# Patient Record
Sex: MALE | Employment: OTHER | URBAN - METROPOLITAN AREA
[De-identification: names, ages, dates, MRNs, and addresses within clinical notes are randomized per-mention and may not be internally consistent; named-entity substitution may affect disease eponyms.]

---

## 2022-04-12 ENCOUNTER — TRANSFERRED RECORDS (OUTPATIENT)
Dept: HEALTH INFORMATION MANAGEMENT | Facility: CLINIC | Age: 87
End: 2022-04-12

## 2022-04-20 ENCOUNTER — TRANSFERRED RECORDS (OUTPATIENT)
Dept: HEALTH INFORMATION MANAGEMENT | Facility: CLINIC | Age: 87
End: 2022-04-20

## 2022-07-26 ENCOUNTER — TRANSFERRED RECORDS (OUTPATIENT)
Dept: HEALTH INFORMATION MANAGEMENT | Facility: CLINIC | Age: 87
End: 2022-07-26

## 2023-05-08 ENCOUNTER — TELEPHONE (OUTPATIENT)
Dept: PRIMARY CARE | Facility: CLINIC | Age: 88
End: 2023-05-08

## 2023-05-08 DIAGNOSIS — I10 BENIGN ESSENTIAL HYPERTENSION: ICD-10-CM

## 2023-05-08 DIAGNOSIS — K58.9 IRRITABLE BOWEL SYNDROME, UNSPECIFIED TYPE: ICD-10-CM

## 2023-05-08 DIAGNOSIS — I48.20 CHRONIC ATRIAL FIBRILLATION (MULTI): ICD-10-CM

## 2023-05-08 DIAGNOSIS — F51.01 PRIMARY INSOMNIA: Primary | ICD-10-CM

## 2023-05-08 DIAGNOSIS — E78.2 HYPERLIPEMIA, MIXED: ICD-10-CM

## 2023-05-08 PROBLEM — K21.9 GASTROESOPHAGEAL REFLUX DISEASE WITHOUT ESOPHAGITIS: Status: ACTIVE | Noted: 2023-05-08

## 2023-05-08 PROBLEM — J30.9 ALLERGIC RHINITIS: Status: ACTIVE | Noted: 2023-05-08

## 2023-05-08 PROBLEM — G47.00 INSOMNIA: Status: ACTIVE | Noted: 2023-05-08

## 2023-05-08 PROBLEM — I48.91 ATRIAL FIBRILLATION (MULTI): Status: ACTIVE | Noted: 2023-05-08

## 2023-05-08 PROBLEM — N40.0 BPH WITHOUT URINARY OBSTRUCTION: Status: ACTIVE | Noted: 2023-05-08

## 2023-05-08 RX ORDER — ATORVASTATIN CALCIUM 20 MG/1
20 TABLET, FILM COATED ORAL DAILY
COMMUNITY
Start: 2021-01-29 | End: 2023-05-08 | Stop reason: SDUPTHER

## 2023-05-08 RX ORDER — APIXABAN 5 MG/1
5 TABLET, FILM COATED ORAL 2 TIMES DAILY
COMMUNITY
End: 2023-05-08 | Stop reason: SDUPTHER

## 2023-05-08 RX ORDER — DICYCLOMINE HYDROCHLORIDE 10 MG/1
10 CAPSULE ORAL 3 TIMES DAILY
COMMUNITY
Start: 2021-01-06 | End: 2023-05-08 | Stop reason: SDUPTHER

## 2023-05-08 RX ORDER — TRAZODONE HYDROCHLORIDE 50 MG/1
50 TABLET ORAL NIGHTLY PRN
COMMUNITY
End: 2023-05-08 | Stop reason: SDUPTHER

## 2023-05-08 RX ORDER — RAMIPRIL 5 MG/1
5 CAPSULE ORAL DAILY
COMMUNITY
Start: 2020-12-21 | End: 2023-05-08 | Stop reason: SDUPTHER

## 2023-05-08 RX ORDER — TRAZODONE HYDROCHLORIDE 50 MG/1
50 TABLET ORAL NIGHTLY PRN
Qty: 90 TABLET | Refills: 0 | Status: SHIPPED | OUTPATIENT
Start: 2023-05-08

## 2023-05-08 RX ORDER — RAMIPRIL 5 MG/1
5 CAPSULE ORAL DAILY
Qty: 90 CAPSULE | Refills: 0 | Status: SHIPPED | OUTPATIENT
Start: 2023-05-08 | End: 2023-07-19 | Stop reason: SDUPTHER

## 2023-05-08 RX ORDER — DICYCLOMINE HYDROCHLORIDE 10 MG/1
10 CAPSULE ORAL 3 TIMES DAILY
Qty: 180 CAPSULE | Refills: 0 | Status: SHIPPED | OUTPATIENT
Start: 2023-05-08

## 2023-05-08 RX ORDER — ATORVASTATIN CALCIUM 20 MG/1
20 TABLET, FILM COATED ORAL DAILY
Qty: 90 TABLET | Refills: 0 | Status: SHIPPED | OUTPATIENT
Start: 2023-05-08

## 2023-05-18 DIAGNOSIS — K21.9 GASTROESOPHAGEAL REFLUX DISEASE WITHOUT ESOPHAGITIS: Primary | ICD-10-CM

## 2023-05-18 RX ORDER — OMEPRAZOLE 40 MG/1
40 CAPSULE, DELAYED RELEASE ORAL DAILY
Qty: 90 CAPSULE | Refills: 0 | Status: SHIPPED | OUTPATIENT
Start: 2023-05-18

## 2023-05-18 RX ORDER — OMEPRAZOLE 40 MG/1
40 CAPSULE, DELAYED RELEASE ORAL DAILY
COMMUNITY
Start: 2023-02-22 | End: 2023-05-18 | Stop reason: SDUPTHER

## 2023-07-17 DIAGNOSIS — I10 BENIGN ESSENTIAL HYPERTENSION: ICD-10-CM

## 2023-07-17 RX ORDER — RAMIPRIL 5 MG/1
CAPSULE ORAL
Qty: 90 CAPSULE | Refills: 3 | OUTPATIENT
Start: 2023-07-17

## 2023-07-19 RX ORDER — RAMIPRIL 5 MG/1
5 CAPSULE ORAL DAILY
Qty: 90 CAPSULE | Refills: 0 | Status: SHIPPED | OUTPATIENT
Start: 2023-07-19

## 2023-08-22 ENCOUNTER — OFFICE VISIT (OUTPATIENT)
Dept: FAMILY MEDICINE | Facility: CLINIC | Age: 88
End: 2023-08-22
Payer: COMMERCIAL

## 2023-08-22 VITALS
TEMPERATURE: 98.4 F | SYSTOLIC BLOOD PRESSURE: 126 MMHG | WEIGHT: 169.06 LBS | DIASTOLIC BLOOD PRESSURE: 80 MMHG | OXYGEN SATURATION: 95 % | HEART RATE: 69 BPM | RESPIRATION RATE: 16 BRPM | HEIGHT: 67 IN | BODY MASS INDEX: 26.53 KG/M2

## 2023-08-22 DIAGNOSIS — I25.10 CORONARY ARTERY DISEASE INVOLVING NATIVE HEART WITHOUT ANGINA PECTORIS, UNSPECIFIED VESSEL OR LESION TYPE: ICD-10-CM

## 2023-08-22 DIAGNOSIS — G47.00 INSOMNIA, UNSPECIFIED TYPE: ICD-10-CM

## 2023-08-22 DIAGNOSIS — Z76.89 ENCOUNTER TO ESTABLISH CARE: Primary | ICD-10-CM

## 2023-08-22 DIAGNOSIS — I48.91 ATRIAL FIBRILLATION, UNSPECIFIED TYPE (H): ICD-10-CM

## 2023-08-22 DIAGNOSIS — K21.9 GASTROESOPHAGEAL REFLUX DISEASE WITHOUT ESOPHAGITIS: ICD-10-CM

## 2023-08-22 DIAGNOSIS — N40.0 BPH WITHOUT URINARY OBSTRUCTION: ICD-10-CM

## 2023-08-22 DIAGNOSIS — I10 BENIGN ESSENTIAL HYPERTENSION: ICD-10-CM

## 2023-08-22 DIAGNOSIS — K58.9 IRRITABLE BOWEL SYNDROME, UNSPECIFIED TYPE: ICD-10-CM

## 2023-08-22 DIAGNOSIS — I48.92 ATRIAL FLUTTER, UNSPECIFIED TYPE (H): ICD-10-CM

## 2023-08-22 DIAGNOSIS — E78.2 HYPERLIPEMIA, MIXED: ICD-10-CM

## 2023-08-22 LAB
ANION GAP SERPL CALCULATED.3IONS-SCNC: 8 MMOL/L (ref 7–15)
BUN SERPL-MCNC: 12.4 MG/DL (ref 8–23)
CALCIUM SERPL-MCNC: 8.8 MG/DL (ref 8.8–10.2)
CHLORIDE SERPL-SCNC: 106 MMOL/L (ref 98–107)
CHOLEST SERPL-MCNC: 108 MG/DL
CREAT SERPL-MCNC: 0.83 MG/DL (ref 0.67–1.17)
DEPRECATED HCO3 PLAS-SCNC: 26 MMOL/L (ref 22–29)
GFR SERPL CREATININE-BSD FRML MDRD: 85 ML/MIN/1.73M2
GLUCOSE SERPL-MCNC: 61 MG/DL (ref 70–99)
HDLC SERPL-MCNC: 42 MG/DL
LDLC SERPL CALC-MCNC: 52 MG/DL
NONHDLC SERPL-MCNC: 66 MG/DL
POTASSIUM SERPL-SCNC: 4.4 MMOL/L (ref 3.4–5.3)
SODIUM SERPL-SCNC: 140 MMOL/L (ref 136–145)
TRIGL SERPL-MCNC: 72 MG/DL

## 2023-08-22 PROCEDURE — 99204 OFFICE O/P NEW MOD 45 MIN: CPT

## 2023-08-22 PROCEDURE — 80048 BASIC METABOLIC PNL TOTAL CA: CPT

## 2023-08-22 PROCEDURE — 36415 COLL VENOUS BLD VENIPUNCTURE: CPT

## 2023-08-22 PROCEDURE — 80061 LIPID PANEL: CPT

## 2023-08-22 RX ORDER — DICYCLOMINE HYDROCHLORIDE 10 MG/1
10 CAPSULE ORAL PRN
COMMUNITY
Start: 2023-05-08 | End: 2023-12-05

## 2023-08-22 RX ORDER — ATORVASTATIN CALCIUM 20 MG/1
20 TABLET, FILM COATED ORAL DAILY
Qty: 90 TABLET | Refills: 0 | Status: SHIPPED | OUTPATIENT
Start: 2023-08-22 | End: 2023-11-03

## 2023-08-22 RX ORDER — RAMIPRIL 5 MG/1
20 CAPSULE ORAL DAILY
COMMUNITY
Start: 2023-07-19 | End: 2023-10-21

## 2023-08-22 RX ORDER — CETIRIZINE HYDROCHLORIDE 10 MG/1
10 TABLET ORAL PRN
COMMUNITY
End: 2023-12-05

## 2023-08-22 RX ORDER — TRAZODONE HYDROCHLORIDE 50 MG/1
50 TABLET, FILM COATED ORAL PRN
COMMUNITY
Start: 2023-05-08 | End: 2023-09-20

## 2023-08-22 RX ORDER — OMEPRAZOLE 40 MG/1
40 CAPSULE, DELAYED RELEASE ORAL DAILY
COMMUNITY
Start: 2023-05-18 | End: 2023-08-22

## 2023-08-22 RX ORDER — ATORVASTATIN CALCIUM 20 MG/1
20 TABLET, FILM COATED ORAL DAILY
COMMUNITY
Start: 2023-05-08 | End: 2023-08-22

## 2023-08-22 NOTE — ASSESSMENT & PLAN NOTE
Patient reports that he had an ablation approximately 10 years ago for an unknown reason ([place] [facility] mentions possible SVT). He had been maintained on metoprolol XL for an extended period of time, but with a recent visit with his PCP in [place], he was noted to be in atrial flutter with bradycardia to the 30s.  They stopped his metoprolol at this time and his heart rates normalized. He was started on stroke prophylaxis via Eliquis 5 mg twice daily and followed with cardiology in [place] for this.  He has not established with cardiology in [place].  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  We reviewed Eliquis today together, and due to his age, would be reasonable to decrease his Eliquis to 2-1/2 mg twice daily.  This was done today.  I do think will be beneficial for patient to establish with cardiology within [place], due to his history, and this referral was placed today.

## 2023-08-22 NOTE — ASSESSMENT & PLAN NOTE
Patient reports he was started on omeprazole and has noticed significant improvement in symptoms. Has been avoiding trigger foods and making lifestyle adjustments. He is amenable to reducing this to 20mg, so this change was made today. Will monitor for recurrence of symptoms and step up therapy if needed.

## 2023-08-22 NOTE — PROGRESS NOTES
Assessment & Plan   Problem List Items Addressed This Visit          Digestive    Gastroesophageal reflux disease without esophagitis     Patient reports he was started on omeprazole and has noticed significant improvement in symptoms. Has been avoiding trigger foods and making lifestyle adjustments. He is amenable to reducing this to 20mg, so this change was made today. Will monitor for recurrence of symptoms and step up therapy if needed.         Relevant Medications    dicyclomine (BENTYL) 10 MG capsule    omeprazole (PRILOSEC) 20 MG DR capsule    Irritable bowel syndrome     Uses Bentyl as needed with good control of symptoms.  No concerns today.         Relevant Medications    dicyclomine (BENTYL) 10 MG capsule    omeprazole (PRILOSEC) 20 MG DR capsule       Endocrine    Hyperlipemia, mixed     Medications include atorvastatin 20 mg daily.  We will update lipid panel today and provide refill.           Relevant Medications    atorvastatin (LIPITOR) 20 MG tablet    Other Relevant Orders    Lipid Profile (Chol, Trig, HDL, LDL calc)       Circulatory    Benign essential hypertension     Current medications include ramipril 5mg daily. /80.  BMP today.         Relevant Medications    ramipril (ALTACE) 5 MG capsule    Other Relevant Orders    Basic metabolic panel  (Ca, Cl, CO2, Creat, Gluc, K, Na, BUN)    Coronary artery disease involving native heart without angina pectoris, unspecified vessel or lesion type     Risk factors appear controlled today.  Continue on atorvastatin for lipid management and ramipril for blood pressure management.  Order placed patient to establish with cardiology as dictated elsewhere.         Atrial flutter, unspecified type (H)     Patient reports that he had an ablation approximately 10 years ago for an unknown reason ([place] [facility] mentions possible SVT). He had been maintained on metoprolol XL for an extended period of time, but with a recent visit with his PCP in [place], he was  noted to be in atrial flutter with bradycardia to the 30s.  They stopped his metoprolol at this time and his heart rates normalized. He was started on stroke prophylaxis via Eliquis 5 mg twice daily and followed with cardiology in [place] for this.  He has not established with cardiology in [place].  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  We reviewed Eliquis today together, and due to his age, would be reasonable to decrease his Eliquis to 2-1/2 mg twice daily.  This was done today.  I do think will be beneficial for patient to establish with cardiology within [place], due to his history, and this referral was placed today.         RESOLVED: Atrial fibrillation (H)    Relevant Medications    apixaban ANTICOAGULANT (ELIQUIS) 2.5 MG tablet    Other Relevant Orders    Adult Cardiology Eval Loren Referral       Urinary    BPH without urinary obstruction     Symptoms are not overly bothersome to patient.  He has trialed Flomax without real improvement.            Other    Insomnia     Uses low-dose trazodone approximately once a week for insomnia.  Tolerates well.          Other Visit Diagnoses       Encounter to establish care    -  Primary           LEELA Melendez CNP  [facility] is a 87 year old, presenting for the following health issues:  Establish Care (Previously clinic in [place]. Found records in Care everywhere today.)        8/22/2023     8:59 AM   Additional Questions   Roomed by sac   Accompanied by self--wife in lobby         8/22/2023     8:59 AM   Patient Reported Additional Medications   Patient reports taking the following new medications no     Patient here to establish care today.  He has no acute concerns with his health.  Previously doctored in [facility] the [facility].  His records are available today.  Overall doing  "very well.  He reports that he needs a refill on his cholesterol medication omeprazole.    History of Present Illness       Reason for visit:  New patient    He eats 2-3 servings of fruits and vegetables daily.He consumes 1 sweetened beverage(s) daily.He exercises with enough effort to increase his heart rate 20 to 29 minutes per day.  He exercises with enough effort to increase his heart rate 7 days per week.   He is taking medications regularly.     Review of Systems         Objective    Ht 1.709 m (5' 7.3\")   Wt 76.7 kg (169 lb 1 oz)   BMI 26.24 kg/m    Body mass index is 26.24 kg/m .    Physical Exam  Vitals and nursing note reviewed.   Constitutional:       Appearance: Normal appearance.   Cardiovascular:      Rate and Rhythm: Normal rate and regular rhythm.      Pulses: Normal pulses.      Heart sounds: No murmur heard.  Pulmonary:      Effort: Pulmonary effort is normal. No respiratory distress.   Neurological:      Mental Status: He is alert.   Psychiatric:         Mood and Affect: Mood normal.         Behavior: Behavior normal.         Thought Content: Thought content normal.                  "

## 2023-08-22 NOTE — PATIENT INSTRUCTIONS
I will follow up with you on labs drawn today.  Establish care with cardiology to discuss long term anticoagulation and medications based on your history. Today, I did decrease your dose to 2.5mg as is recommended for your age.  Schedule a Medicare Annual Wellness Visit for some time in the next month to discuss preventative medicine and any additional needs!

## 2023-08-22 NOTE — ASSESSMENT & PLAN NOTE
Medications include atorvastatin 20 mg daily.  We will update lipid panel today and provide refill.

## 2023-08-22 NOTE — ASSESSMENT & PLAN NOTE
Risk factors appear controlled today.  Continue on atorvastatin for lipid management and ramipril for blood pressure management.  Order placed patient to establish with cardiology as dictated elsewhere.

## 2023-08-22 NOTE — ASSESSMENT & PLAN NOTE
Stenting approximately 15 years with valve repair. Also has had an ablation about 10 years ago, which resolved his atrial fibrillation.

## 2023-08-23 ENCOUNTER — TELEPHONE (OUTPATIENT)
Dept: FAMILY MEDICINE | Facility: CLINIC | Age: 88
End: 2023-08-23
Payer: COMMERCIAL

## 2023-08-23 DIAGNOSIS — E16.2 HYPOGLYCEMIA: Primary | ICD-10-CM

## 2023-08-23 NOTE — TELEPHONE ENCOUNTER
General Call    Contacts         Type Contact Phone/Fax    08/23/2023 09:35 AM CDT Phone (Incoming) Floyd Fong (Self) [phone] (H)          Reason for Call:  Toenail Trim    What are your questions or concerns:  Patient had appointment 8/22 with Evie Payne and forgot to ask if toenail trimming is a service offered at the clinic. Patient is unable to trim own nails due to mobility.    Date of last appointment with provider: 8/22/2023    Could we send this information to you in [place] or would you prefer to receive a phone call?:   Patient would prefer a phone call   Okay to leave a detailed message?: Yes at Cell number on file:    Telephone Information:   Mobile [phone]

## 2023-08-23 NOTE — TELEPHONE ENCOUNTER
Please see below note from LEELA Mayer CNP, he can go to any of the Nail care places or make appointment with LEELA Mayer CNP to have his nails clipped.

## 2023-08-23 NOTE — TELEPHONE ENCOUNTER
Patient does not have diabetes so doesn't necessarily require podiatry; if clinic has nail clipping supplies I can likely assist with this but may be cheaper and more available for him to go elsewhere if you could please provide him with recommendations for these services.  Evie Payne, LEELA CNP on 8/23/2023 at 12:20 PM

## 2023-08-28 ENCOUNTER — OFFICE VISIT (OUTPATIENT)
Dept: CARDIOLOGY | Facility: CLINIC | Age: 88
End: 2023-08-28
Payer: COMMERCIAL

## 2023-08-28 VITALS
HEART RATE: 60 BPM | DIASTOLIC BLOOD PRESSURE: 72 MMHG | WEIGHT: 166 LBS | SYSTOLIC BLOOD PRESSURE: 124 MMHG | BODY MASS INDEX: 26.06 KG/M2 | HEIGHT: 67 IN | RESPIRATION RATE: 16 BRPM

## 2023-08-28 DIAGNOSIS — Z98.890 S/P MVR (MITRAL VALVE REPAIR): Primary | ICD-10-CM

## 2023-08-28 DIAGNOSIS — I48.91 ATRIAL FIBRILLATION, UNSPECIFIED TYPE (H): ICD-10-CM

## 2023-08-28 PROCEDURE — 99204 OFFICE O/P NEW MOD 45 MIN: CPT | Performed by: INTERNAL MEDICINE

## 2023-08-28 RX ORDER — ACETAMINOPHEN 325 MG/1
325-650 TABLET ORAL EVERY 6 HOURS PRN
COMMUNITY
End: 2023-12-05

## 2023-08-28 NOTE — LETTER
8/28/2023    Evie REYES Kerry, APRN CNP  [address]  [facility] [address]    RE: Floyd Fong       Dear Colleague,     I had the pleasure of seeing Floyd VARUN Fong in the [facility].    HEART CARE ENCOUNTER CONSULTATON NOTE      [facility]  [phone]      Assessment/Recommendations   Assessment/Plan:  1.  Patient here to establish care with a history of mitral valve repair, coronary artery disease with coronary stenting apparently to the LAD a number of years ago based on his recall, atrial fibrillation/atrial flutter.    1.  Mitral valve repair.  Echocardiogram report from March 2022 reveals an excellent repair and preserved left ventricular systolic function.  This was completed at the [facility] with plans to repeat an echocardiogram to establish care here in [place].  I will comment once the echocardiogram is available for review.    2.  Atrial fibrillation/atrial flutter.  Per discussion with the patient he was noted to be in atrial flutter 1 year ago with slow ventricular response.  He was admitted to the hospital overnight and metoprolol was discontinued.  Heart rates currently are in the 60s without associated dizziness or lightheadedness.  I did suggest to him that utilizing a 24-hour monitor would be useful to define heart rate and rhythm.  He is in agreement.  We talked about use of Eliquis and I have recommended that he take 5 mg twice daily as he has only 1 of 3 characteristics that require lower dose Eliquis.  He is greater than age 81 years of age but weighs more than 130 pounds and creatinine is within normal limits on recent evaluation.  We did discuss alternatives that included the Watchman device but at this point he prefers to stay on blood thinner and has been steady in his feet.    3.  Risk modification.  Would like to get some additional data from the [facility] regarding prior coronary stenting.  Recently he had lipids  that demonstrated cholesterol 108 with an LDL of 52, normal basic metabolic profile with normal AST and ALT in March 2022.    4.  Weight loss.  He mentions approximately 10 pound weight loss in the past 6 to 12 months unintentionally.  No specific symptoms of GI discomfort, bleeding or change in appetite.  I did tell him that I would mention this to his primary care physician via email.      Plan 1.  Echocardiogram  2.  24-hour Holter monitor  3.  Continue with current cardiovascular medication regimen including Eliquis 5 mg p.o. twice daily  4.  Follow-up in 3 to 4 months.  ECG today demonstrates low voltage QRS, right bundle branch block,    Likely underlying atrial flutter or atrial fibrillation a little difficult to discern the atrial activity.  Plan for Holter monitor as noted.       History of Present Illness/Subjective    HPI: Floyd Fong is a 87 year old male new patient to me today.  Notes below are from primary care.  Patient previously has been seen at the [facility] and prior to that the [facility]..  He tells me that he underwent ablation procedure 10 years ago he thinks for atrial dysrhythmia possible atrial fibrillation but does not know the details.  In addition he has undergone prior mitral valve repair back in 2006 but did not mean bypass surgery.  Subsequent to the mitral valve repair a number of years later he developed chest discomfort and recalls having a stent to what was described as a  maker.  He reports that he has not had any additional symptoms in the interim.  He states that 1 year ago he was being evaluated and was found to be bradycardic with heart rates in the 30s but without associated symptoms.  Reportedly he was in atrial flutter with a slow ventricular response and was taken off metoprolol and states in the interim he has felt well.  He specifically denies chest discomfort, shortness of breath, dizziness, orthopnea or PND.  I have reviewed recent laboratory  studies where his lipids are at goal.    Review of systems is pertinent for some weight loss.  He states that he weighed 175+ pounds 1 year ago and more recently is 166 pounds.  No change in bowel habits, no blood in stool, no change in appetite.  I did indicate that I would confirm with his primary care provider.    Cardiovascular risk factors are negative for tobacco, negative for alcohol, negative for diabetes, patient is on ramipril.  Family history is pertinent for a father who had heart disease in his 60s.  Positive for hyperlipidemia.    There is an ECG in the chart record from 2022 that reported atrial flutter with right bundle branch block.  There is a note from [place] from July 2022 that reported persistent atrial fibrillation.  No associated symptoms.  There is an echocardiogram from 2022 at which time ejection fraction was said to be 65 to 70%.  Severe left atrial enlargement.  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     Clearly unfortunately, sooner discussed complicated diagnosis  Atrial flutter, unspecified type (H)        Patient reports that he had an ablation approximately 10 years ago for an unknown reason ([place] [facility] mentions possible SVT). He had been maintained on metoprolol XL for an extended period of time, but with a recent visit with his PCP in [place], he was noted to be in atrial flutter with bradycardia to the 30s.  They stopped his metoprolol at this time and his heart rates normalized. He was started on stroke prophylaxis via Eliquis 5 mg twice daily and followed with cardiology in [place] for this.  He has not established with cardiology in [place].  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            Recent Echocardiogram Results:  [facility], On license of [facility] [place], [place] [address]              Tel [phone] and Fax  [phone]    TRANSTHORACIC ECHOCARDIOGRAM REPORT      Patient Name:     THERESA Ledesma Physician:   [id number] Rohan Hernandez MD  Study Date:       3/19/2022          Referring Physician: MYRTLE BREWER  MRN/PID:          [id number]           PCP:                 Adrian Allen MD  Accession/Order#: [id number]          Department Location: [facility]  YOB: 1935         Fellow:  Gender:           M                  Nurse:  Admit Date:       3/18/2022          Sonographer:         Imtiaz Mccormick [facility]  Admission Status: Inpatient -        Additional Staff:                   Priority discharge  Height:           175.00 cm          CC Report to:        [address] [place] [address]  Weight:           81.01 kg           Study Type:          Echocardiogram  BSA:              1.97 m2  Blood Pressure: 99 /59 mmHg    Diagnosis/ICD: I48.91-Unspecified atrial fibrillation; R00.0-Tachycardia,                unspecified  Indication:  Procedure/CPT: Echo Complete w Full [id number]    Patient History:  Valve Disorders:   Mitral Valve Repair.  Pertinent History: A-Fib.    Study Detail: The following Echo studies were performed: 2D, M-Mode, Doppler and               color flow. A bubble study was not performed.      PHYSICIAN INTERPRETATION:  Left Ventricle: The left ventricular systolic function is normal, with an estimated ejection fraction of 65-70%. The patient is in atrial fibrillation which may influence the estimate of left ventricular function and transvalvular flows. There are no regional wall motion abnormalities. The left ventricular cavity size is normal. There is left ventricular concentric remodeling. Spectral Doppler shows an abnormal pattern of left ventricular diastolic filling.  Left Atrium: The left atrium is severely dilated.  Right Ventricle: The right ventricle is mildly enlarged. There is mildly reduced right ventricular systolic  function.  Right Atrium: The right atrium is severely dilated.  Aortic Valve: The aortic valve is trileaflet. There is minimal aortic valve cusp calcification. There is trace to mild aortic valve regurgitation. The peak instantaneous gradient of the aortic valve is 4.5 mmHg.  Mitral Valve: The mitral valve is mildly thickened. Status post mitral annular ring repair. There is trace mitral valve regurgitation.  Tricuspid Valve: The tricuspid valve is structurally normal. There is mild to moderate tricuspid regurgitation. The Doppler estimated RVSP is slightly elevated at 31.3 mmHg.  Pulmonic Valve: The pulmonic valve is structurally normal. There is trace pulmonic valve regurgitation.  Pericardium: There is no pericardial effusion noted.  Aorta: The aortic root is normal.  Systemic Veins: The inferior vena cava appears mildly dilated. There is IVC inspiratory collapse greater than 50%.  In comparison to the previous echocardiogram(s): There are no prior studies on this patient for comparison purposes.      CONCLUSIONS:  1. The left ventricular systolic function is normal with a 65-70% estimated ejection fraction.  2. Spectral Doppler shows an abnormal pattern of left ventricular diastolic filling.  3. The left atrium is severely dilated.  4. The right atrium is severely dilated.  5. Slightly elevated RVSP.  6. There is mild to moderate tricuspid regurgitation.  7. The patient is in atrial fibrillation which may influence the estimate of left ventricular function and transvalvular flows.        arrative  Performed by [facility]  Atrial flutter  Right bundle branch block  Abnormal ECG  When compared with ECG of 12-APR-2022 12:21,  Current undetermined rhythm precludes rhythm comparison, needs review  Right bundle branch block is now Present  Confirmed by Manuel Baker ([id number]) on 7/27/2022 8:55:48 AM  Procedure Note       Physical Examination  Review of Systems   Vitals: 124/72, weight 166 pounds, heart rate of  60 and irregular  Wt Readings from Last 3 Encounters:   08/22/23 76.7 kg (169 lb 1 oz)       General Appearance:   no distress, normal body habitus   ENT/Mouth: membranes moist, no oral lesions or bleeding gums.      EYES:  no scleral icterus, normal conjunctivae   Neck: no carotid bruits 0   Chest/Lungs:   lungs are clear to auscultation, no rales or wheezing, well-healed sternal scar, equal chest wall expansion    Cardiovascular:   Irregular normal first and second heart sounds with 1/6 to 2/6 systolic murmur left ventricular apex, rubs, or gallops; the carotid, radial and posterior tibial pulses are intact, Jugular venous pressure within normal limits, no significant edema bilaterally    Abdomen:  no  bruits, or tenderness; bowel sounds are present   Extremities: no cyanosis or clubbing   Skin: no xanthelasma, warm.    Neurologic:  no tremors     Psychiatric: alert and oriented x3, calm        Please refer above for cardiac ROS details.        Medical History  Surgical History Family History Social History   Past Medical History:   Diagnosis Date    Benign essential hypertension 05/08/2023     No past surgical history on file.  No family history on file.     Social History     Socioeconomic History    Marital status:      Spouse name: Not on file    Number of children: Not on file    Years of education: Not on file    Highest education level: Not on file   Occupational History    Not on file   Tobacco Use    Smoking status: Never     Passive exposure: Never    Smokeless tobacco: Never   Vaping Use    Vaping Use: Never used   Substance and Sexual Activity    Alcohol use: Not on file    Drug use: Not on file    Sexual activity: Not on file   Other Topics Concern    Not on file   Social History Narrative    Not on file     Social Determinants of Health     Financial Resource Strain: Not on file   Food Insecurity: Not on file   Transportation Needs: Not on file   Physical Activity: Not on file   Stress: Not on  file   Social Connections: Not on file   Intimate Partner Violence: Not on file   Housing Stability: Not on file           Medications  Allergies   Current Outpatient Medications   Medication Sig Dispense Refill    apixaban ANTICOAGULANT (ELIQUIS) 2.5 MG tablet Take 1 tablet (2.5 mg) by mouth 2 times daily for 90 days 180 tablet 0    apixaban ANTICOAGULANT (ELIQUIS) 5 MG tablet Take 5 mg by mouth 2 times daily      atorvastatin (LIPITOR) 20 MG tablet Take 1 tablet (20 mg) by mouth daily 90 tablet 0    cetirizine (ZYRTEC) 10 MG tablet Take 10 mg by mouth as needed for allergies      dicyclomine (BENTYL) 10 MG capsule Take 10 mg by mouth as needed      omeprazole (PRILOSEC) 20 MG DR capsule Take 1 capsule (20 mg) by mouth daily for 90 days 90 capsule 0    ramipril (ALTACE) 5 MG capsule Take 20 mg by mouth daily      traZODone (DESYREL) 50 MG tablet Take 50 mg by mouth as needed       No Known Allergies       Lab Results    Chemistry/lipid CBC Cardiac Enzymes/BNP/TSH/INR   Recent Labs   Lab Test 08/22/23  0954   CHOL 108   HDL 42   LDL 52   TRIG 72     Recent Labs   Lab Test 08/22/23  0954   LDL 52     Recent Labs   Lab Test 08/22/23  0954      POTASSIUM 4.4   CHLORIDE 106   CO2 26   GLC 61*   BUN 12.4   CR 0.83   GFRESTIMATED 85   ANNMARIE 8.8     Recent Labs   Lab Test 08/22/23  0954   CR 0.83     No results for input(s): A1C in the last 20795 hours.       No results for input(s): WBC, HGB, HCT, MCV, PLT in the last 24718 hours.  No results for input(s): HGB in the last 58885 hours. No results for input(s): TROPONINI in the last 15795 hours.  No results for input(s): BNP, NTBNPI, NTBNP in the last 66794 hours.  No results for input(s): TSH in the last 70360 hours.  No results for input(s): INR in the last 46714 hours.     Jan Zarco MD      Thank you for allowing me to participate in the care of your patient.      Sincerely,     Jan Zarco MD     [facility]  Heart Care  cc:   LEELA Melendez CNP  [address] [place] [address]

## 2023-08-28 NOTE — PROGRESS NOTES
HEART CARE ENCOUNTER CONSULTATON NOTE      [facility]  [phone]      Assessment/Recommendations   Assessment/Plan:  1.  Patient here to establish care with a history of mitral valve repair, coronary artery disease with coronary stenting apparently to the LAD a number of years ago based on his recall, atrial fibrillation/atrial flutter.    1.  Mitral valve repair.  Echocardiogram report from March 2022 reveals an excellent repair and preserved left ventricular systolic function.  This was completed at the [facility] with plans to repeat an echocardiogram to establish care here in [place].  I will comment once the echocardiogram is available for review.    2.  Atrial fibrillation/atrial flutter.  Per discussion with the patient he was noted to be in atrial flutter 1 year ago with slow ventricular response.  He was admitted to the hospital overnight and metoprolol was discontinued.  Heart rates currently are in the 60s without associated dizziness or lightheadedness.  I did suggest to him that utilizing a 24-hour monitor would be useful to define heart rate and rhythm.  He is in agreement.  We talked about use of Eliquis and I have recommended that he take 5 mg twice daily as he has only 1 of 3 characteristics that require lower dose Eliquis.  He is greater than age 81 years of age but weighs more than 130 pounds and creatinine is within normal limits on recent evaluation.  We did discuss alternatives that included the Watchman device but at this point he prefers to stay on blood thinner and has been steady in his feet.    3.  Risk modification.  Would like to get some additional data from the [facility] regarding prior coronary stenting.  Recently he had lipids that demonstrated cholesterol 108 with an LDL of 52, normal basic metabolic profile with normal AST and ALT in March 2022.    4.  Weight loss.  He mentions approximately 10 pound weight loss in the past 6 to 12 months  Spoke to patient. Telehealth appointment scheduled on Thursday, 2/2/23 @ 930 am with Phyllis Swanson PA-C. Nothing further needed at this time. unintentionally.  No specific symptoms of GI discomfort, bleeding or change in appetite.  I did tell him that I would mention this to his primary care physician via email.      Plan 1.  Echocardiogram  2.  24-hour Holter monitor  3.  Continue with current cardiovascular medication regimen including Eliquis 5 mg p.o. twice daily  4.  Follow-up in 3 to 4 months.  ECG today demonstrates low voltage QRS, right bundle branch block,    Likely underlying atrial flutter or atrial fibrillation a little difficult to discern the atrial activity.  Plan for Holter monitor as noted.       History of Present Illness/Subjective    HPI: Floyd Fong is a 87 year old male new patient to me today.  Notes below are from primary care.  Patient previously has been seen at the [facility] and prior to that the [facility]..  He tells me that he underwent ablation procedure 10 years ago he thinks for atrial dysrhythmia possible atrial fibrillation but does not know the details.  In addition he has undergone prior mitral valve repair back in 2006 but did not mean bypass surgery.  Subsequent to the mitral valve repair a number of years later he developed chest discomfort and recalls having a stent to what was described as a  maker.  He reports that he has not had any additional symptoms in the interim.  He states that 1 year ago he was being evaluated and was found to be bradycardic with heart rates in the 30s but without associated symptoms.  Reportedly he was in atrial flutter with a slow ventricular response and was taken off metoprolol and states in the interim he has felt well.  He specifically denies chest discomfort, shortness of breath, dizziness, orthopnea or PND.  I have reviewed recent laboratory studies where his lipids are at goal.    Review of systems is pertinent for some weight loss.  He states that he weighed 175+ pounds 1 year ago and more recently is 166 pounds.  No change in bowel habits, no blood in  stool, no change in appetite.  I did indicate that I would confirm with his primary care provider.    Cardiovascular risk factors are negative for tobacco, negative for alcohol, negative for diabetes, patient is on ramipril.  Family history is pertinent for a father who had heart disease in his 60s.  Positive for hyperlipidemia.    There is an ECG in the chart record from 2022 that reported atrial flutter with right bundle branch block.  There is a note from [place] from July 2022 that reported persistent atrial fibrillation.  No associated symptoms.  There is an echocardiogram from 2022 at which time ejection fraction was said to be 65 to 70%.  Severe left atrial enlargement.  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     Clearly unfortunately, sooner discussed complicated diagnosis  Atrial flutter, unspecified type (H)        Patient reports that he had an ablation approximately 10 years ago for an unknown reason ([place] [facility] mentions possible SVT). He had been maintained on metoprolol XL for an extended period of time, but with a recent visit with his PCP in [place], he was noted to be in atrial flutter with bradycardia to the 30s.  They stopped his metoprolol at this time and his heart rates normalized. He was started on stroke prophylaxis via Eliquis 5 mg twice daily and followed with cardiology in [place] for this.  He has not established with cardiology in [place].  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            Recent Echocardiogram Results:  [facility] [address]              Tel [phone] and Fax [phone]    TRANSTHORACIC ECHOCARDIOGRAM REPORT      Patient Name:     THERESA Ledesma Physician:   [id number] Rohan Hernandez MD  Study Date:       3/19/2022          Referring Physician: MYRTLE BREWER  MRN/PID:           [id number]           PCP:                 Adrian Allen MD  Accession/Order#: [id number]          Department Location: [facility]  YOB: 1935         Fellow:  Gender:           M                  Nurse:  Admit Date:       3/18/2022          Sonographer:         Imtiaz Mccormick RDCS  Admission Status: Inpatient -        Additional Staff:                   Priority discharge  Height:           175.00 cm          CC Report to:        [address] [place] [address]  Weight:           81.01 kg           Study Type:          Echocardiogram  BSA:              1.97 m2  Blood Pressure: 99 /59 mmHg    Diagnosis/ICD: I48.91-Unspecified atrial fibrillation; R00.0-Tachycardia,                unspecified  Indication:  Procedure/CPT: Echo Complete w Full [id number]    Patient History:  Valve Disorders:   Mitral Valve Repair.  Pertinent History: A-Fib.    Study Detail: The following Echo studies were performed: 2D, M-Mode, Doppler and               color flow. A bubble study was not performed.      PHYSICIAN INTERPRETATION:  Left Ventricle: The left ventricular systolic function is normal, with an estimated ejection fraction of 65-70%. The patient is in atrial fibrillation which may influence the estimate of left ventricular function and transvalvular flows. There are no regional wall motion abnormalities. The left ventricular cavity size is normal. There is left ventricular concentric remodeling. Spectral Doppler shows an abnormal pattern of left ventricular diastolic filling.  Left Atrium: The left atrium is severely dilated.  Right Ventricle: The right ventricle is mildly enlarged. There is mildly reduced right ventricular systolic function.  Right Atrium: The right atrium is severely dilated.  Aortic Valve: The aortic valve is trileaflet. There is minimal aortic valve cusp calcification. There is trace to mild aortic valve regurgitation. The peak  instantaneous gradient of the aortic valve is 4.5 mmHg.  Mitral Valve: The mitral valve is mildly thickened. Status post mitral annular ring repair. There is trace mitral valve regurgitation.  Tricuspid Valve: The tricuspid valve is structurally normal. There is mild to moderate tricuspid regurgitation. The Doppler estimated RVSP is slightly elevated at 31.3 mmHg.  Pulmonic Valve: The pulmonic valve is structurally normal. There is trace pulmonic valve regurgitation.  Pericardium: There is no pericardial effusion noted.  Aorta: The aortic root is normal.  Systemic Veins: The inferior vena cava appears mildly dilated. There is IVC inspiratory collapse greater than 50%.  In comparison to the previous echocardiogram(s): There are no prior studies on this patient for comparison purposes.      CONCLUSIONS:  1. The left ventricular systolic function is normal with a 65-70% estimated ejection fraction.  2. Spectral Doppler shows an abnormal pattern of left ventricular diastolic filling.  3. The left atrium is severely dilated.  4. The right atrium is severely dilated.  5. Slightly elevated RVSP.  6. There is mild to moderate tricuspid regurgitation.  7. The patient is in atrial fibrillation which may influence the estimate of left ventricular function and transvalvular flows.        arrative  Performed by [facility]  Atrial flutter  Right bundle branch block  Abnormal ECG  When compared with ECG of 12-APR-2022 12:21,  Current undetermined rhythm precludes rhythm comparison, needs review  Right bundle branch block is now Present  Confirmed by Manuel Baker ([id number]) on 7/27/2022 8:55:48 AM  Procedure Note       Physical Examination  Review of Systems   Vitals: 124/72, weight 166 pounds, heart rate of 60 and irregular  Wt Readings from Last 3 Encounters:   08/22/23 76.7 kg (169 lb 1 oz)       General Appearance:   no distress, normal body habitus   ENT/Mouth: membranes moist, no oral lesions or bleeding gums.       EYES:  no scleral icterus, normal conjunctivae   Neck: no carotid bruits 0   Chest/Lungs:   lungs are clear to auscultation, no rales or wheezing, well-healed sternal scar, equal chest wall expansion    Cardiovascular:   Irregular normal first and second heart sounds with 1/6 to 2/6 systolic murmur left ventricular apex, rubs, or gallops; the carotid, radial and posterior tibial pulses are intact, Jugular venous pressure within normal limits, no significant edema bilaterally    Abdomen:  no  bruits, or tenderness; bowel sounds are present   Extremities: no cyanosis or clubbing   Skin: no xanthelasma, warm.    Neurologic:  no tremors     Psychiatric: alert and oriented x3, calm        Please refer above for cardiac ROS details.        Medical History  Surgical History Family History Social History   Past Medical History:   Diagnosis Date    Benign essential hypertension 05/08/2023     No past surgical history on file.  No family history on file.     Social History     Socioeconomic History    Marital status:      Spouse name: Not on file    Number of children: Not on file    Years of education: Not on file    Highest education level: Not on file   Occupational History    Not on file   Tobacco Use    Smoking status: Never     Passive exposure: Never    Smokeless tobacco: Never   Vaping Use    Vaping Use: Never used   Substance and Sexual Activity    Alcohol use: Not on file    Drug use: Not on file    Sexual activity: Not on file   Other Topics Concern    Not on file   Social History Narrative    Not on file     Social Determinants of Health     Financial Resource Strain: Not on file   Food Insecurity: Not on file   Transportation Needs: Not on file   Physical Activity: Not on file   Stress: Not on file   Social Connections: Not on file   Intimate Partner Violence: Not on file   Housing Stability: Not on file           Medications  Allergies   Current Outpatient Medications   Medication Sig Dispense Refill     apixaban ANTICOAGULANT (ELIQUIS) 2.5 MG tablet Take 1 tablet (2.5 mg) by mouth 2 times daily for 90 days 180 tablet 0    apixaban ANTICOAGULANT (ELIQUIS) 5 MG tablet Take 5 mg by mouth 2 times daily      atorvastatin (LIPITOR) 20 MG tablet Take 1 tablet (20 mg) by mouth daily 90 tablet 0    cetirizine (ZYRTEC) 10 MG tablet Take 10 mg by mouth as needed for allergies      dicyclomine (BENTYL) 10 MG capsule Take 10 mg by mouth as needed      omeprazole (PRILOSEC) 20 MG DR capsule Take 1 capsule (20 mg) by mouth daily for 90 days 90 capsule 0    ramipril (ALTACE) 5 MG capsule Take 20 mg by mouth daily      traZODone (DESYREL) 50 MG tablet Take 50 mg by mouth as needed       No Known Allergies       Lab Results    Chemistry/lipid CBC Cardiac Enzymes/BNP/TSH/INR   Recent Labs   Lab Test 08/22/23  0954   CHOL 108   HDL 42   LDL 52   TRIG 72     Recent Labs   Lab Test 08/22/23  0954   LDL 52     Recent Labs   Lab Test 08/22/23  0954      POTASSIUM 4.4   CHLORIDE 106   CO2 26   GLC 61*   BUN 12.4   CR 0.83   GFRESTIMATED 85   ANNMARIE 8.8     Recent Labs   Lab Test 08/22/23  0954   CR 0.83     No results for input(s): A1C in the last 81999 hours.       No results for input(s): WBC, HGB, HCT, MCV, PLT in the last 69269 hours.  No results for input(s): HGB in the last 57179 hours. No results for input(s): TROPONINI in the last 89637 hours.  No results for input(s): BNP, NTBNPI, NTBNP in the last 76873 hours.  No results for input(s): TSH in the last 23753 hours.  No results for input(s): INR in the last 73773 hours.     Jan Zarco MD

## 2023-08-28 NOTE — PATIENT INSTRUCTIONS
Nice to meet you today.  We talked about your prior heart history.  We will try to get some additional information from [place] but I learned that you had a prior mitral valve repair and an echocardiogram from March 2022 revealed that the mitral valve repair continued to be excellent and your heart function was normal.  I also learned that you were in the hospital 1 year ago for slow heartbeat and later found to be in atrial flutter and at that time the metoprolol was discontinued.  I am pleased to hear that you do not have any symptoms of chest discomfort or shortness of breath or dizziness.  Please monitor for any symptoms.  I suggested that we complete an ultrasound of your heart to relook at the valve repair and heart function.  I also suggested that you wear a 24-hour monitor just so I can further define your heart rhythm and your heart rate.  Please call my nurse Esetphania at [phone] or write to me on [facility] with questions.  I will be in touch with the results.    We also discussed the current dose of Eliquis.  Given that your kidney function is normal and that your weight is above 130 pounds I think taking 5 mg twice daily of the Eliquis is the current recommendation.  I sent in a new prescription for the 5 mg tablets to take twice daily but in the interim you can take 2 of the 2.5 mg twice daily.  Please update me with any bleeding issues or other symptoms.  Okay so we did talk about the Watchman device but at this point you feel comfortable being on the blood thinner.  Please avoid any aspirin or Motrin or other similar medicines.

## 2023-08-28 NOTE — TELEPHONE ENCOUNTER
Patient stopped by clinic today phone numbers given to him to call if he does not want to go to local nail store

## 2023-09-20 DIAGNOSIS — G47.00 INSOMNIA, UNSPECIFIED TYPE: Primary | ICD-10-CM

## 2023-09-20 RX ORDER — TRAZODONE HYDROCHLORIDE 50 MG/1
50 TABLET, FILM COATED ORAL PRN
Qty: 90 TABLET | Refills: 3 | Status: SHIPPED | OUTPATIENT
Start: 2023-09-20 | End: 2024-05-21

## 2023-09-20 NOTE — TELEPHONE ENCOUNTER
Medication Request  Medication name: traZODone (DESYREL) 50 MG tablet   Requested Pharmacy: [facility]  When was patient last seen for this?:  8/22/23  Patient offered appointment:  N/A pharmacy sent request  Okay to leave a detailed message: no    Reported medication. Pharmacy is requesting 90-day supply for best insurance coverage.

## 2023-09-25 ENCOUNTER — HOSPITAL ENCOUNTER (OUTPATIENT)
Dept: CARDIOLOGY | Facility: HOSPITAL | Age: 88
Discharge: HOME | End: 2023-09-25
Attending: INTERNAL MEDICINE
Payer: COMMERCIAL

## 2023-09-25 DIAGNOSIS — I48.91 ATRIAL FIBRILLATION, UNSPECIFIED TYPE (H): ICD-10-CM

## 2023-09-25 DIAGNOSIS — Z98.890 S/P MVR (MITRAL VALVE REPAIR): ICD-10-CM

## 2023-09-25 PROCEDURE — 93306 TTE W/DOPPLER COMPLETE: CPT | Mod: 26 | Performed by: INTERNAL MEDICINE

## 2023-09-25 PROCEDURE — 93306 TTE W/DOPPLER COMPLETE: CPT

## 2023-09-25 PROCEDURE — 93226 XTRNL ECG REC<48 HR SCAN A/R: CPT

## 2023-09-27 NOTE — RESULT ENCOUNTER NOTE
Echo shows normal heart function, mitral valve repair,without significant valve abnormality, moderate tr, normal rv function, my chart note sent  mdg await holter

## 2023-10-03 PROCEDURE — 93227 XTRNL ECG REC<48 HR R&I: CPT | Performed by: INTERNAL MEDICINE

## 2023-10-11 NOTE — RESULT ENCOUNTER NOTE
Holter shows known atrial fibrillation with controlled ventricular response, recent  echo shows normal heart function,good repair of the mitral valve, he should let us know about dizziness, shortness of breath, chest discomfort, I had wanted to get additional data from the [facility], he was going to discuss with primary re weight loss, follow up in 3 to 4 months  mgarr

## 2023-10-21 DIAGNOSIS — I48.92 ATRIAL FLUTTER, UNSPECIFIED TYPE (H): Primary | ICD-10-CM

## 2023-10-21 NOTE — TELEPHONE ENCOUNTER
Medication Request  Medication name: ramipril (ALTACE) 5 MG capsule   Requested Pharmacy: [facility]  When was patient last seen for this?:  8/22/2023  Patient offered appointment:  N/A pharmacy sent request  Okay to leave a detailed message: no

## 2023-10-22 ENCOUNTER — HEALTH MAINTENANCE LETTER (OUTPATIENT)
Age: 88
End: 2023-10-22

## 2023-10-23 RX ORDER — RAMIPRIL 5 MG/1
20 CAPSULE ORAL DAILY
Qty: 360 CAPSULE | Refills: 2 | Status: SHIPPED | OUTPATIENT
Start: 2023-10-23 | End: 2023-10-26

## 2023-10-26 DIAGNOSIS — I48.92 ATRIAL FLUTTER, UNSPECIFIED TYPE (H): ICD-10-CM

## 2023-10-26 RX ORDER — RAMIPRIL 5 MG/1
5 CAPSULE ORAL DAILY
Qty: 90 CAPSULE | Refills: 2 | Status: SHIPPED | OUTPATIENT
Start: 2023-10-26 | End: 2024-02-07

## 2023-10-26 RX ORDER — RAMIPRIL 5 MG/1
5 CAPSULE ORAL DAILY
Qty: 90 CAPSULE | Refills: 2 | Status: CANCELLED | OUTPATIENT
Start: 2023-10-26

## 2023-10-26 NOTE — TELEPHONE ENCOUNTER
General Call    Contacts         Type Contact Phone/Fax    10/26/2023 10:23 AM CDT Phone (Incoming) Floyd Fong (Self) [phone] (H)          Reason for Call:  Prescription clarification    What are your questions or concerns:  [facility] requesting clarification on ramipril 5 mg prescription. 20 mg dose as prescribed is above prescribing limits. Writer spoke with patient and he reports taking ramipril 5 mg capsules, 1 capsule daily. Please cancel previous previous prescription and send new prescription to pharmacy.    Date of last appointment with provider: 8/22/2023    Could we send this information to you in [facility] or would you prefer to receive a phone call?:   Patient would prefer a phone call   Okay to leave a detailed message?: Yes at Cell number on file:    Telephone Information:   Mobile [phone]

## 2023-11-03 DIAGNOSIS — E78.2 HYPERLIPEMIA, MIXED: ICD-10-CM

## 2023-11-03 RX ORDER — ATORVASTATIN CALCIUM 20 MG/1
20 TABLET, FILM COATED ORAL DAILY
Qty: 90 TABLET | Refills: 2 | Status: SHIPPED | OUTPATIENT
Start: 2023-11-03 | End: 2024-08-19

## 2023-11-03 NOTE — TELEPHONE ENCOUNTER
Medication Request  Medication name: atorvastatin (LIPITOR) 20 MG tablet   Requested Pharmacy: [facility]  When was patient last seen for this?:  8/22/2023  Patient offered appointment:  N/A pharmacy sent request  Okay to leave a detailed message: no

## 2023-11-06 DIAGNOSIS — E78.2 HYPERLIPEMIA, MIXED: ICD-10-CM

## 2023-11-06 DIAGNOSIS — I48.91 ATRIAL FIBRILLATION, UNSPECIFIED TYPE (H): ICD-10-CM

## 2023-11-08 ENCOUNTER — TELEPHONE (OUTPATIENT)
Dept: FAMILY MEDICINE | Facility: CLINIC | Age: 88
End: 2023-11-08
Payer: COMMERCIAL

## 2023-11-14 ENCOUNTER — TELEPHONE (OUTPATIENT)
Dept: FAMILY MEDICINE | Facility: CLINIC | Age: 88
End: 2023-11-14
Payer: COMMERCIAL

## 2023-11-14 DIAGNOSIS — I48.91 ATRIAL FIBRILLATION, UNSPECIFIED TYPE (H): Primary | ICD-10-CM

## 2023-11-14 NOTE — TELEPHONE ENCOUNTER
I sent in the updated prescription for 5mg tablets, BID, for 90 days. Please let patient know and confirm that insurance was not requesting 30 day supply at a time for better coverage as it looks like that duration is what was prepped?

## 2023-11-14 NOTE — TELEPHONE ENCOUNTER
General Call    Contacts         Type Contact Phone/Fax    11/14/2023 03:38 PM CST Phone (Incoming) Floyd Fong (Self) [phone] (H)          Reason for Call:  Prescription Change    What are your questions or concerns:  Patient calling to request prescription for apixaban-Eliquis 5 mg tablets, 1 tablet twice daily (10mg total). Pharmacy reports that patient has better insurance coverage with this dispense than with 2.5 mg tablets, 2 tablets twice daily (10mg total). Patient is requesting this be sent as soon as possible, he only has 5 days left of last dispense.    Could we [place] you [place] or would you prefer to receive a phone call?:   Patient would prefer a phone call   Okay to leave a detailed message?: Yes at Cell number on file:    Telephone Information:   Mobile [phone]

## 2023-12-05 ENCOUNTER — OFFICE VISIT (OUTPATIENT)
Dept: FAMILY MEDICINE | Facility: CLINIC | Age: 88
End: 2023-12-05
Payer: COMMERCIAL

## 2023-12-05 VITALS
OXYGEN SATURATION: 100 % | BODY MASS INDEX: 25.53 KG/M2 | TEMPERATURE: 98.9 F | WEIGHT: 172.38 LBS | DIASTOLIC BLOOD PRESSURE: 80 MMHG | SYSTOLIC BLOOD PRESSURE: 127 MMHG | RESPIRATION RATE: 16 BRPM | HEIGHT: 69 IN | HEART RATE: 60 BPM

## 2023-12-05 DIAGNOSIS — I10 BENIGN ESSENTIAL HYPERTENSION: ICD-10-CM

## 2023-12-05 DIAGNOSIS — L60.0 INGROWN TOENAIL OF LEFT FOOT: ICD-10-CM

## 2023-12-05 DIAGNOSIS — K58.9 IRRITABLE BOWEL SYNDROME, UNSPECIFIED TYPE: ICD-10-CM

## 2023-12-05 DIAGNOSIS — I48.92 ATRIAL FLUTTER, UNSPECIFIED TYPE (H): ICD-10-CM

## 2023-12-05 DIAGNOSIS — E78.2 HYPERLIPEMIA, MIXED: ICD-10-CM

## 2023-12-05 DIAGNOSIS — I25.10 CORONARY ARTERY DISEASE INVOLVING NATIVE HEART WITHOUT ANGINA PECTORIS, UNSPECIFIED VESSEL OR LESION TYPE: ICD-10-CM

## 2023-12-05 DIAGNOSIS — Z00.00 ENCOUNTER FOR MEDICARE ANNUAL WELLNESS EXAM: Primary | ICD-10-CM

## 2023-12-05 DIAGNOSIS — K21.9 GASTROESOPHAGEAL REFLUX DISEASE WITHOUT ESOPHAGITIS: ICD-10-CM

## 2023-12-05 PROCEDURE — 36415 COLL VENOUS BLD VENIPUNCTURE: CPT

## 2023-12-05 PROCEDURE — G0439 PPPS, SUBSEQ VISIT: HCPCS

## 2023-12-05 PROCEDURE — 80053 COMPREHEN METABOLIC PANEL: CPT

## 2023-12-05 PROCEDURE — 99214 OFFICE O/P EST MOD 30 MIN: CPT | Mod: 25

## 2023-12-05 PROCEDURE — 80061 LIPID PANEL: CPT

## 2023-12-05 RX ORDER — DICYCLOMINE HYDROCHLORIDE 10 MG/1
10 CAPSULE ORAL 4 TIMES DAILY PRN
Qty: 30 CAPSULE | Refills: 1 | Status: SHIPPED | OUTPATIENT
Start: 2023-12-05 | End: 2024-03-15

## 2023-12-05 RX ORDER — RESPIRATORY SYNCYTIAL VIRUS VACCINE 120MCG/0.5
0.5 KIT INTRAMUSCULAR ONCE
Qty: 1 EACH | Refills: 0 | Status: CANCELLED | OUTPATIENT
Start: 2023-12-05 | End: 2023-12-05

## 2023-12-05 NOTE — ASSESSMENT & PLAN NOTE
Anticoagulated on Eliquis 5 mg twice daily.  Follows with cardiology.  No concerns or signs of bleeding.

## 2023-12-05 NOTE — ASSESSMENT & PLAN NOTE
Uses Bentyl as needed with good control of symptoms.  Refill sent today.   5-Fu Pregnancy And Lactation Text: This medication is Pregnancy Category X and contraindicated in pregnancy and in women who may become pregnant. It is unknown if this medication is excreted in breast milk.

## 2023-12-05 NOTE — ASSESSMENT & PLAN NOTE
Blood pressure today 127/80, indicating good control.  Current medications include ramipril 5 mg daily.  Updated labs today.

## 2023-12-05 NOTE — PROGRESS NOTES
SUBJECTIVE:   Floyd is a 88 year old, presenting for the following:  Wellness Visit, Ingrown Toenail (LT great toe. Ref to podietthu.), and Referral (To new cardiologist Dr. Carolee pulliam.)        12/5/2023    10:51 AM   Additional Questions   Roomed by sac   Accompanied by Wife-Veronica         12/5/2023    10:51 AM   Patient Reported Additional Medications   Patient reports taking the following new medications no       Are you in the first 12 months of your Medicare coverage?  No    In addition to preventative medicine, patient is looking for a referral to podiatry due to an ingrown toenail on his left big toe.  Additionally, his cardiologist is retiring and he is wondering about an updated referral.    History of Present Illness       Vascular Disease:  He presents for follow up of vascular disease.     He never takes nitroglycerin. He is not taking daily aspirin.    Reason for visit:  Wellness visit    He eats 0-1 servings of fruits and vegetables daily.He consumes 0 sweetened beverage(s) daily.He exercises with enough effort to increase his heart rate 10 to 19 minutes per day.  He exercises with enough effort to increase his heart rate 7 days per week.   He is taking medications regularly.      Today's PHQ-2 Score:       12/5/2023    10:46 AM   PHQ-2 ( 1999 [employer])   Q1: Little interest or pleasure in doing things 0   Q2: Feeling down, depressed or hopeless 1   PHQ-2 Score 1   Q1: Little interest or pleasure in doing things Not at all   Q2: Feeling down, depressed or hopeless Several days   PHQ-2 Score 1     Have you ever done Advance Care Planning? (For example, a Health Directive, POLST, or a discussion with a medical provider or your loved ones about your wishes): Yes, patient states has an Advance Care Planning document and will bring a copy to the clinic.       Fall risk  Fallen 2 or more times in the past year?: No  Any fall with injury in the past year?: No    Cognitive Screening   1) Repeat 3 items (Leader,  Season, Table)  PASS  2) Clock draw: NORMAL  3) 3 item recall: Recalls 3 objects  Results: 3 items recalled: COGNITIVE IMPAIRMENT LESS LIKELY    Mini-CogTM Copyright [employer]. Licensed by the author for use in [facility]; reprinted with permission (jeimy@[facility]). All rights reserved.      Reviewed and updated as needed this visit by clinical staff   Tobacco  Allergies  Meds              Reviewed and updated as needed this visit by Provider                 Social History     Tobacco Use    Smoking status: Never     Passive exposure: Never    Smokeless tobacco: Never   Substance Use Topics    Alcohol use: Not on file              No data to display              Do you have a current opioid prescription? No  Do you use any other controlled substances or medications that are not prescribed by a provider? None              Current providers sharing in care for this patient include:   Patient Care Team:  Evie Payne APRN CNP as PCP - General (Family Medicine)  Evie Payne APRN CNP as Assigned PCP  Jan Zarco MD as MD (Cardiovascular Disease)  Jan Zarco MD as Assigned Heart and Vascular Provider    The following health maintenance items are reviewed in Epic and correct as of today:  Health Maintenance   Topic Date Due    Pneumococcal Vaccine: 65+ Years (1 - PCV) Never done    DTAP/TDAP/TD IMMUNIZATION (1 - Tdap) Never done    ZOSTER IMMUNIZATION (1 of 2) Never done    RSV VACCINE (Pregnancy & 60+) (1 - 1-dose 60+ series) Never done    INFLUENZA VACCINE (1) Never done    MEDICARE ANNUAL WELLNESS VISIT  12/05/2024    ANNUAL REVIEW OF HM ORDERS  12/05/2024    FALL RISK ASSESSMENT  12/05/2024    ADVANCE CARE PLANNING  12/05/2028    PHQ-2 (once per calendar year)  Completed    COVID-19 Vaccine  Completed    IPV IMMUNIZATION  Aged Out    HPV IMMUNIZATION  Aged Out    MENINGITIS IMMUNIZATION  Aged Out    RSV MONOCLONAL ANTIBODY  Aged Out     Lab work is in process  Labs reviewed in  "[facility]  BP Readings from Last 3 Encounters:   12/05/23 127/80   08/28/23 124/72   08/22/23 126/80    Wt Readings from Last 3 Encounters:   12/05/23 78.2 kg (172 lb 6 oz)   08/28/23 75.3 kg (166 lb)   08/22/23 76.7 kg (169 lb 1 oz)                  Patient Active Problem List   Diagnosis    Benign essential hypertension    BPH without urinary obstruction    Gastroesophageal reflux disease without esophagitis    Hyperlipemia, mixed    Insomnia    Irritable bowel syndrome    Coronary artery disease involving native heart without angina pectoris, unspecified vessel or lesion type    Atrial flutter, unspecified type (H)    Encounter for Medicare annual wellness exam    Ingrown toenail of left foot     No past surgical history on file.    Social History     Tobacco Use    Smoking status: Never     Passive exposure: Never    Smokeless tobacco: Never   Substance Use Topics    Alcohol use: Not on file     No family history on file.      Current Outpatient Medications   Medication Sig Dispense Refill    apixaban ANTICOAGULANT (ELIQUIS) 5 MG tablet Take 1 tablet (5 mg) by mouth 2 times daily 180 tablet 2    atorvastatin (LIPITOR) 20 MG tablet Take 1 tablet (20 mg) by mouth daily 90 tablet 2    dicyclomine (BENTYL) 10 MG capsule Take 1 capsule (10 mg) by mouth 4 times daily as needed (diarrhea) 30 capsule 1    omeprazole (PRILOSEC) 20 MG DR capsule Take 1 capsule (20 mg) by mouth daily 90 capsule 3    ramipril (ALTACE) 5 MG capsule Take 1 capsule (5 mg) by mouth daily 90 capsule 2    traZODone (DESYREL) 50 MG tablet Take 1 tablet (50 mg) by mouth as needed for sleep 90 tablet 3     No Known Allergies  Recent Labs   Lab Test 08/22/23  0954   LDL 52   HDL 42   TRIG 72   CR 0.83   GFRESTIMATED 85   POTASSIUM 4.4        Review of Systems      OBJECTIVE:   /80 (BP Location: Right arm, Patient Position: Sitting, Cuff Size: Adult Regular)   Pulse 60   Temp 98.9  F (37.2  C) (Oral)   Resp 16   Ht 1.74 m (5' 8.5\")   Wt 78.2 kg " "(172 lb 6 oz)   SpO2 100%   BMI 25.83 kg/m   Estimated body mass index is 25.83 kg/m  as calculated from the following:    Height as of this encounter: 1.74 m (5' 8.5\").    Weight as of this encounter: 78.2 kg (172 lb 6 oz).    Physical Exam  GENERAL: healthy, alert and no distress  RESP: lungs clear to auscultation - no rales, rhonchi or wheezes  CV: regular rate and rhythm, normal S1 S2, no S3 or S4, no murmur, click or rub, no peripheral edema and peripheral pulses strong  MS: no gross musculoskeletal defects noted, no edema  PSYCH: mentation appears normal, affect normal/bright    ASSESSMENT / PLAN:     Problem List Items Addressed This Visit       Benign essential hypertension     Blood pressure today 127/80, indicating good control.  Current medications include ramipril 5 mg daily.  Updated labs today.         Relevant Orders    Comprehensive metabolic panel (BMP + Alb, Alk Phos, ALT, AST, Total. Bili, TP)    Gastroesophageal reflux disease without esophagitis     Stable on omeprazole.  Was recently reduced to 20 mg and doing well at this dose.  Refill sent today.         Relevant Medications    omeprazole (PRILOSEC) 20 MG DR capsule    dicyclomine (BENTYL) 10 MG capsule    Hyperlipemia, mixed     Tolerating atorvastatin 20 mg daily well.  Lipids and ALT updated today.         Relevant Orders    Lipid Profile (Chol, Trig, HDL, LDL calc)    Irritable bowel syndrome     Uses Bentyl as needed with good control of symptoms.  Refill sent today.         Relevant Medications    omeprazole (PRILOSEC) 20 MG DR capsule    dicyclomine (BENTYL) 10 MG capsule    Coronary artery disease involving native heart without angina pectoris, unspecified vessel or lesion type     Sees cardiology.  Relatively stable.  On daily aspirin and atorvastatin.  Good blood pressure management.  We discussed that although his current cardiologist is retiring, I would recommend that he asked for referral within the same clinic and that he " "should not need an updated referral placed in the EMR today.         Atrial flutter, unspecified type (H)     Anticoagulated on Eliquis 5 mg twice daily.  Follows with cardiology.  No concerns or signs of bleeding.         Encounter for Medicare annual wellness exam - Primary     Annual Medicare visit.  Discussed preventative medicine.  Patient to believes that he is up-to-date on all routine vaccinations, but these records are unavailable today as he previously had care in [place].  He will work to obtain these.  Discussed fall risk, sedentary lifestyle, and other risk factors.  Overall, doing well.  Follow-up in 1 year or sooner if needed/indicated.         Ingrown toenail of left foot     Not resolving with conservative cares.  Referral placed to podiatry.         Relevant Orders    Orthopedic  Referral     COUNSELING:  Reviewed preventive health counseling, as reflected in patient instructions       Regular exercise       Healthy diet/nutrition       Dental care       Fall risk prevention      BMI:   Estimated body mass index is 25.83 kg/m  as calculated from the following:    Height as of this encounter: 1.74 m (5' 8.5\").    Weight as of this encounter: 78.2 kg (172 lb 6 oz).         He reports that he has never smoked. He has never been exposed to tobacco smoke. He has never used smokeless tobacco.      Appropriate preventive services were discussed with this patient, including applicable screening as appropriate for fall prevention, nutrition, physical activity, Tobacco-use cessation, weight loss and cognition.  Checklist reviewing preventive services available has been given to the patient.    Reviewed patients plan of care and provided an AVS. The Basic Care Plan (routine screening as documented in Health Maintenance) for Floyd meets the Care Plan requirement. This Care Plan has been established and reviewed with the Patient.    LEELA Melendez CNP  [facility] " [facility]    Identified Health Risks:  I have reviewed Opioid Use Disorder and Substance Use Disorder risk factors and made any needed referrals.

## 2023-12-05 NOTE — ASSESSMENT & PLAN NOTE
Annual Medicare visit.  Discussed preventative medicine.  Patient to believes that he is up-to-date on all routine vaccinations, but these records are unavailable today as he previously had care in [place].  He will work to obtain these.  Discussed fall risk, sedentary lifestyle, and other risk factors.  Overall, doing well.  Follow-up in 1 year or sooner if needed/indicated.

## 2023-12-05 NOTE — ASSESSMENT & PLAN NOTE
Sees cardiology.  Relatively stable.  On daily aspirin and atorvastatin.  Good blood pressure management.  We discussed that although his current cardiologist is retiring, I would recommend that he asked for referral within the same clinic and that he should not need an updated referral placed in the EMR today.

## 2023-12-05 NOTE — PATIENT INSTRUCTIONS
For your toenail  -I placed a referral to podiatry. They should call to schedule. I would recommend regular soaking of the nail.    For your heart   -I would call Dr. Zarco's office and ask to be scheduled with one of his colleagues for an 'establish care' visit. They should be able to provide recommendations    I sent in refills on your medication today.    Patient Education   Personalized Prevention Plan  You are due for the preventive services outlined below.  Your care team is available to assist you in scheduling these services.  If you have already completed any of these items, please share that information with your care team to update in your medical record.  Health Maintenance Due   Topic Date Due    ANNUAL REVIEW OF HM ORDERS  Never done    Pneumococcal Vaccine (1 - PCV) Never done    Diptheria Tetanus Pertussis (DTAP/TDAP/TD) Vaccine (1 - Tdap) Never done    Zoster (Shingles) Vaccine (1 of 2) Never done    RSV VACCINE (Pregnancy & 60+) (1 - 1-dose 60+ series) Never done    Flu Vaccine (1) Never done

## 2023-12-05 NOTE — ASSESSMENT & PLAN NOTE
Stable on omeprazole.  Was recently reduced to 20 mg and doing well at this dose.  Refill sent today.

## 2023-12-06 LAB
ALBUMIN SERPL BCG-MCNC: 4 G/DL (ref 3.5–5.2)
ALP SERPL-CCNC: 73 U/L (ref 40–150)
ALT SERPL W P-5'-P-CCNC: 14 U/L (ref 0–70)
ANION GAP SERPL CALCULATED.3IONS-SCNC: 8 MMOL/L (ref 7–15)
AST SERPL W P-5'-P-CCNC: 26 U/L (ref 0–45)
BILIRUB SERPL-MCNC: 0.8 MG/DL
BUN SERPL-MCNC: 12.3 MG/DL (ref 8–23)
CALCIUM SERPL-MCNC: 8.8 MG/DL (ref 8.8–10.2)
CHLORIDE SERPL-SCNC: 105 MMOL/L (ref 98–107)
CHOLEST SERPL-MCNC: 106 MG/DL
CREAT SERPL-MCNC: 0.79 MG/DL (ref 0.67–1.17)
DEPRECATED HCO3 PLAS-SCNC: 27 MMOL/L (ref 22–29)
EGFRCR SERPLBLD CKD-EPI 2021: 86 ML/MIN/1.73M2
FASTING STATUS PATIENT QL REPORTED: NO
GLUCOSE SERPL-MCNC: 78 MG/DL (ref 70–99)
HDLC SERPL-MCNC: 43 MG/DL
LDLC SERPL CALC-MCNC: 49 MG/DL
NONHDLC SERPL-MCNC: 63 MG/DL
POTASSIUM SERPL-SCNC: 4.2 MMOL/L (ref 3.4–5.3)
PROT SERPL-MCNC: 6.2 G/DL (ref 6.4–8.3)
SODIUM SERPL-SCNC: 140 MMOL/L (ref 135–145)
TRIGL SERPL-MCNC: 68 MG/DL

## 2023-12-13 ENCOUNTER — MYC REFILL (OUTPATIENT)
Dept: FAMILY MEDICINE | Facility: CLINIC | Age: 88
End: 2023-12-13
Payer: COMMERCIAL

## 2023-12-13 DIAGNOSIS — I48.91 ATRIAL FIBRILLATION, UNSPECIFIED TYPE (H): ICD-10-CM

## 2024-02-07 ENCOUNTER — TELEPHONE (OUTPATIENT)
Dept: FAMILY MEDICINE | Facility: CLINIC | Age: 89
End: 2024-02-07
Payer: COMMERCIAL

## 2024-02-07 DIAGNOSIS — I48.92 ATRIAL FLUTTER, UNSPECIFIED TYPE (H): ICD-10-CM

## 2024-02-07 RX ORDER — RAMIPRIL 5 MG/1
5 CAPSULE ORAL DAILY
Qty: 90 CAPSULE | Refills: 2 | Status: SHIPPED | OUTPATIENT
Start: 2024-02-07 | End: 2024-09-04

## 2024-02-07 NOTE — TELEPHONE ENCOUNTER
Please let Floyd know I sent this in to the requested pharmacy. Thank you!   Evie Payne, LEELA CNP on 2/7/2024 at 11:45 AM

## 2024-02-07 NOTE — TELEPHONE ENCOUNTER
Medication Request  Medication name: ramipril (ALTACE) 5 MG capsule   Requested Pharmacy: [facility]   When was patient last seen for this?:  12/5/2023  Patient offered appointment:  No  Okay to leave a detailed message: yes    Patient is changing mail order pharmacies. At this time, he reports that only ramipril requires a new prescription and that all others were able to be transferred. Patient will confirm with [facility] and call clinic back if additional prescriptions need pharmacy change.    Patient is requesting call when prescription is sent.

## 2024-03-15 DIAGNOSIS — K58.9 IRRITABLE BOWEL SYNDROME, UNSPECIFIED TYPE: ICD-10-CM

## 2024-03-15 RX ORDER — DICYCLOMINE HYDROCHLORIDE 10 MG/1
10 CAPSULE ORAL 4 TIMES DAILY PRN
Qty: 60 CAPSULE | Refills: 1 | Status: SHIPPED | OUTPATIENT
Start: 2024-03-15 | End: 2024-08-01

## 2024-03-15 NOTE — TELEPHONE ENCOUNTER
Medication Question or Refill    Contacts         Type Contact Phone/Fax    03/15/2024 03:15 PM CDT Phone (Incoming) Floyd Fong (Self) [phone] (H)            What medication are you calling about (include dose and sig)?:   dicyclomine (BENTYL) 10 MG capsule Take 1 capsule (10 mg) by mouth 4 times daily as needed (diarrhea)       Preferred Pharmacy:   [facility] [place] [facility] AT [address] [facility]  [place] [address]  Phone: [phone] Fax: [phone]    Who prescribed the medication?: LEELA Mayer CNP     Do you need a refill? Yes    Patient offered an appointment? No    Do you have any questions or concerns?  Yes: Requesting 60 day supply vs 30, prepped below       Could we send this information to you in [facility] or would you prefer to receive a phone call?:   Patient would prefer a phone call   Okay to leave a detailed message?: No at Home number on file [phone] (home)

## 2024-03-15 NOTE — TELEPHONE ENCOUNTER
Prescription approved per [facility] Refill Protocol.    Requested Prescriptions   Pending Prescriptions Disp Refills    dicyclomine (BENTYL) 10 MG capsule 60 capsule 1     Sig: Take 1 capsule (10 mg) by mouth 4 times daily as needed (diarrhea)       Oral Anticholinergic Agents Passed - 3/15/2024  3:17 PM        Passed - Patient is of age 12 or older        Passed - Recent (12 mo) or future (30 days) visit with authorizing provider's specialty     The patient must have completed an in-person or virtual visit within the past 12 months or has a future visit scheduled within the next 90 days with the authorizing provider s specialty.  Urgent care and e-visits do not quality as an office visit for this protocol.          Passed - Medication is active on med list

## 2024-05-10 ENCOUNTER — TELEPHONE (OUTPATIENT)
Dept: FAMILY MEDICINE | Facility: CLINIC | Age: 89
End: 2024-05-10
Payer: COMMERCIAL

## 2024-05-10 NOTE — TELEPHONE ENCOUNTER
"Patient called reporting his wife of almost 70 years, recently passed away after having a stroke she never regained consciousness from, and since then he's been having difficulty sleeping.  States only able to sleep maybe 3-4 hours a night \"and I need more sleep more than that,\" and requesting a prescription for something to help him sleep.      RN apologized for the loss of his wife.  Reviewed medications with patient and asked if he has been taking his prescription trazodone (50 mg) at night, or if he's tried this.  Patient confirms he has the trazodone prescription at home but hasn't been taking it, \"I got to the point I wanted to try sleeping on my own without any medication,\" and does not recall when last taken.  He will restart this tonight and call clinic back if he is still having difficulty sleeping on the trazodone 50 mg.  Patient had no further questions/concerns at this time.      Debbie Miller RN  [facility]   "

## 2024-05-21 ENCOUNTER — OFFICE VISIT (OUTPATIENT)
Dept: FAMILY MEDICINE | Facility: CLINIC | Age: 89
End: 2024-05-21
Payer: COMMERCIAL

## 2024-05-21 VITALS
DIASTOLIC BLOOD PRESSURE: 82 MMHG | OXYGEN SATURATION: 97 % | HEART RATE: 62 BPM | RESPIRATION RATE: 16 BRPM | WEIGHT: 175.1 LBS | TEMPERATURE: 97.8 F | BODY MASS INDEX: 25.93 KG/M2 | HEIGHT: 69 IN | SYSTOLIC BLOOD PRESSURE: 134 MMHG

## 2024-05-21 DIAGNOSIS — G47.00 INSOMNIA, UNSPECIFIED TYPE: ICD-10-CM

## 2024-05-21 DIAGNOSIS — F43.21 GRIEF: Primary | ICD-10-CM

## 2024-05-21 PROCEDURE — G2211 COMPLEX E/M VISIT ADD ON: HCPCS

## 2024-05-21 PROCEDURE — 96127 BRIEF EMOTIONAL/BEHAV ASSMT: CPT

## 2024-05-21 PROCEDURE — 99214 OFFICE O/P EST MOD 30 MIN: CPT

## 2024-05-21 RX ORDER — RESPIRATORY SYNCYTIAL VIRUS VACCINE 120MCG/0.5
0.5 KIT INTRAMUSCULAR ONCE
Qty: 1 EACH | Refills: 0 | Status: CANCELLED | OUTPATIENT
Start: 2024-05-21 | End: 2024-05-21

## 2024-05-21 RX ORDER — TRAZODONE HYDROCHLORIDE 50 MG/1
50-100 TABLET, FILM COATED ORAL
Qty: 90 TABLET | Refills: 3 | Status: SHIPPED | OUTPATIENT
Start: 2024-05-21 | End: 2024-08-01

## 2024-05-21 ASSESSMENT — COLUMBIA-SUICIDE SEVERITY RATING SCALE - C-SSRS
6. HAVE YOU EVER DONE ANYTHING, STARTED TO DO ANYTHING, OR PREPARED TO DO ANYTHING TO END YOUR LIFE?: NO
1. WITHIN THE PAST MONTH, HAVE YOU WISHED YOU WERE DEAD OR WISHED YOU COULD GO TO SLEEP AND NOT WAKE UP?: YES
2. IN THE PAST MONTH, HAVE YOU ACTUALLY HAD ANY THOUGHTS OF KILLING YOURSELF?: NO

## 2024-05-21 ASSESSMENT — PATIENT HEALTH QUESTIONNAIRE - PHQ9
10. IF YOU CHECKED OFF ANY PROBLEMS, HOW DIFFICULT HAVE THESE PROBLEMS MADE IT FOR YOU TO DO YOUR WORK, TAKE CARE OF THINGS AT HOME, OR GET ALONG WITH OTHER PEOPLE: SOMEWHAT DIFFICULT
SUM OF ALL RESPONSES TO PHQ QUESTIONS 1-9: 9
SUM OF ALL RESPONSES TO PHQ QUESTIONS 1-9: 9

## 2024-05-21 NOTE — ASSESSMENT & PLAN NOTE
Secondary to recent passing of his wife of 60+ years. He endorses very good support, primarily in the form of his daughter. He is bright but intermittently tearful on exam today. Expressed my condolences and validated his experience today. His PHQ screening is positive/elevated, but he denies any safety concerns or thoughts/plans of self harm or suicide. He does not feel as if any additional intervention is warranted at this time and I encouraged him to pay close attention to his symptoms and reach out to me if necessary. We briefly reviewed the importance of things such as social connections as he begins to emerge from the more acute phase of grief and he recognizes this will be important. Again, encouraged close follow up.

## 2024-05-21 NOTE — ASSESSMENT & PLAN NOTE
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 Validated this experience with him and discussed that it is quite common during periods of grief for this to happen. He has tolerated trazodone well historically and we discussed his questions about the medication. I would recommend he take 30 minutes prior to going to bed. Because he has tolerated well without side effects, I will have him increase dosing to 100mg hoping that this will help with the duration of sleep he is getting. He is on no other serotoninergic medications. We also discussed the importance of sleep hygiene principles; he will eliminate screens two hours prior to sleep. Discussed a ritualistic bedtime routine. Plan for mental health as documented. I asked that patient try these things over the next 2 weeks and let me know if he is having any improvement. If sleep is still significant disrupted, could consider the addition of doxepin. We briefly discussed a desire to avoid medications such as benzodiazepines, but if symptoms fail to respond could consider low dose zolpidem. Patient expressed understanding of and agreement with this plan. All questions were answered.

## 2024-05-21 NOTE — PROGRESS NOTES
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 Validated this experience with him and discussed that it is quite common during periods of grief for this to happen. He has tolerated trazodone well historically and we discussed his questions about the medication. I would recommend he take 30 minutes prior to going to bed. Because he has tolerated well without side effects, I will have him increase dosing to 100mg hoping that this will help with the duration of sleep he is getting. He is on no other serotoninergic medications. We also discussed the importance of sleep hygiene principles; he will eliminate screens two hours prior to sleep. Discussed a ritualistic bedtime routine. Plan for mental health as documented. I asked that patient try these things over the next 2 weeks and let me know if he is having any improvement. If sleep is still significant disrupted, could consider the addition of doxepin. We briefly discussed a desire to avoid medications such as benzodiazepines, but if symptoms fail to respond could consider low dose zolpidem. Patient expressed understanding of and agreement with this plan. All questions were answered.         Relevant Medications    traZODone (DESYREL) 50 MG tablet    Grief - Primary     Secondary to recent passing of his wife of 60+ years. He endorses very good support, primarily in the form of his daughter. He is bright but intermittently tearful on exam today. Expressed my condolences and validated his experience today. His PHQ screening is positive/elevated, but he denies any safety concerns or thoughts/plans of self harm or suicide. He does not feel as if any additional intervention is warranted at this time and I encouraged him to pay close attention to his symptoms and reach out to me if necessary. We briefly reviewed the importance of things such as  social connections as he begins to emerge from the more acute phase of grief and he recognizes this will be important. Again, encouraged close follow up.           Depression Screening Follow Up        5/21/2024     9:58 AM   PHQ   PHQ-9 Total Score 9   Q9: Thoughts of better off dead/self-harm past 2 weeks Several days   F/U: Thoughts of suicide or self-harm No   F/U: Safety concerns No         5/21/2024     9:58 AM   Last PHQ-9   1.  Little interest or pleasure in doing things 1   2.  Feeling down, depressed, or hopeless 1   3.  Trouble falling or staying asleep, or sleeping too much 3   4.  Feeling tired or having little energy 1   5.  Poor appetite or overeating 1   6.  Feeling bad about yourself 1   7.  Trouble concentrating 0   8.  Moving slowly or restless 0   Q9: Thoughts of better off dead/self-harm past 2 weeks 1   PHQ-9 Total Score 9   In the past two weeks have you had thoughts of suicide or self harm? No   Do you have concerns about your personal safety or the safety of others? No               5/21/2024    11:43 AM   [facility] ([facility])   Within the last month, have you wished you were dead or wished you could go to sleep and not wake up? Yes   Within the last month, have you had any actual thoughts of killing yourself? No   Within the last month, have you ever done anything, started to do anything, or prepared to do anything to end your life? No           Follow Up Actions Taken  Crisis resource information provided in the After Visit Summary    Discussed the following ways the patient can remain in a safe environment:  be around others    Sara Barrientos is a 88 year old, presenting for the following health issues:  Symptoms (Wife passed away a month ago, been having trouble sleeping at night since then. ) and Imm/Inj (Declined all vaccines today )        5/21/2024    10:02 AM   Additional Questions   Roomed by Mitesh Ba MA   Accompanied by Self         5/21/2024    10:02 AM   Patient  "Reported Additional Medications   Patient reports taking the following new medications None     Patient presents today to discuss sleep concerns.  He recently lost his wife of 60+ years  and since her passing, has experienced some significant disruptions to sleep.  He notes that he often times will not go to bed until after midnight.  He has an easy time falling asleep, but will wake up approximately every hour throughout the night until approximately 5 AM when he is up for the day.  He feels fatigued during the day as did not feel as if any of his sleep is restorative.  He has tried melatonin but no other cares at home.  Struggle with insomnia in the past and he has been prescribed trazodone 50 mg.  He has not consistently tried this as he had some questions for me about when to take it/how to utilize it.  He has had no side effects with his medication when he does take it.    He notes that mental health is as good as he can expect.  He endorses some thoughts of being better off dead per his screenings, but adamantly denies any concerns for safety or thoughts/plans of self-harm.  Has good support, primarily in the form of his daughter who lives locally.  He notes that the most challenging aspect has been not having anyone to talk to at home, as he describes himself as a \"gregarious conversationalist.\"  Started to look into some local resources and notes that he lives in an assisted living which does have access to group activities and other events.    History of Present Illness       Reason for visit:  Insomnia  Symptom onset:  3-4 weeks ago  Symptoms include:  Cant sleep  Symptom intensity:  Moderate  Symptom progression:  Staying the same  Had these symptoms before:  No    He eats 0-1 servings of fruits and vegetables daily.He consumes 1 sweetened beverage(s) daily.He exercises with enough effort to increase his heart rate 9 or less minutes per day.  He exercises with enough effort to increase his heart rate 7 " "days per week.   He is taking medications regularly.             Objective    /82 (BP Location: Left arm, Patient Position: Sitting, Cuff Size: Adult Regular)   Pulse 62   Temp 97.8  F (36.6  C) (Oral)   Resp 16   Ht 1.74 m (5' 8.5\")   Wt 79.4 kg (175 lb 1.6 oz)   SpO2 97%   BMI 26.24 kg/m    Body mass index is 26.24 kg/m .    Physical Exam  Vitals and nursing note reviewed.   Constitutional:       General: He is not in acute distress.     Appearance: Normal appearance.   Cardiovascular:      Rate and Rhythm: Normal rate and regular rhythm.   Pulmonary:      Effort: Pulmonary effort is normal. No respiratory distress.   Neurological:      Mental Status: He is alert.   Psychiatric:         Mood and Affect: Affect is not labile, flat or angry.         Behavior: Behavior normal.         Thought Content: Thought content normal.         Judgment: Judgment normal.             Signed Electronically by: LEELA Melendez CNP    "

## 2024-07-25 DIAGNOSIS — I48.91 ATRIAL FIBRILLATION, UNSPECIFIED TYPE (H): ICD-10-CM

## 2024-07-25 NOTE — TELEPHONE ENCOUNTER
Medication Request  Medication name: apixaban ANTICOAGULANT (ELIQUIS) 5 MG tablet   Requested Pharmacy: [facility]  When was patient last seen for this?:  apixaban ANTICOAGULANT (ELIQUIS) 5 MG tablet   Patient offered appointment:  No  Okay to leave a detailed message: yes

## 2024-08-01 ENCOUNTER — OFFICE VISIT (OUTPATIENT)
Dept: FAMILY MEDICINE | Facility: CLINIC | Age: 89
End: 2024-08-01
Payer: COMMERCIAL

## 2024-08-01 ENCOUNTER — TELEPHONE (OUTPATIENT)
Dept: BEHAVIORAL HEALTH | Facility: CLINIC | Age: 89
End: 2024-08-01

## 2024-08-01 VITALS
RESPIRATION RATE: 16 BRPM | TEMPERATURE: 98.5 F | OXYGEN SATURATION: 96 % | SYSTOLIC BLOOD PRESSURE: 113 MMHG | HEART RATE: 73 BPM | DIASTOLIC BLOOD PRESSURE: 72 MMHG

## 2024-08-01 DIAGNOSIS — R26.89 POOR BALANCE: ICD-10-CM

## 2024-08-01 DIAGNOSIS — K58.9 IRRITABLE BOWEL SYNDROME, UNSPECIFIED TYPE: ICD-10-CM

## 2024-08-01 DIAGNOSIS — G47.00 INSOMNIA, UNSPECIFIED TYPE: ICD-10-CM

## 2024-08-01 DIAGNOSIS — K21.9 GASTROESOPHAGEAL REFLUX DISEASE WITHOUT ESOPHAGITIS: ICD-10-CM

## 2024-08-01 DIAGNOSIS — H91.93 DECREASED HEARING OF BOTH EARS: Primary | ICD-10-CM

## 2024-08-01 DIAGNOSIS — F32.2 CURRENT SEVERE EPISODE OF MAJOR DEPRESSIVE DISORDER WITHOUT PSYCHOTIC FEATURES WITHOUT PRIOR EPISODE (H): ICD-10-CM

## 2024-08-01 LAB
ERYTHROCYTE [DISTWIDTH] IN BLOOD BY AUTOMATED COUNT: 12.3 % (ref 10–15)
HCT VFR BLD AUTO: 40.8 % (ref 40–53)
HGB BLD-MCNC: 13.5 G/DL (ref 13.3–17.7)
HOLD SPECIMEN: NORMAL
MCH RBC QN AUTO: 32.2 PG (ref 26.5–33)
MCHC RBC AUTO-ENTMCNC: 33.1 G/DL (ref 31.5–36.5)
MCV RBC AUTO: 97 FL (ref 78–100)
PLATELET # BLD AUTO: 159 10E3/UL (ref 150–450)
RBC # BLD AUTO: 4.19 10E6/UL (ref 4.4–5.9)
WBC # BLD AUTO: 5.9 10E3/UL (ref 4–11)

## 2024-08-01 PROCEDURE — G2211 COMPLEX E/M VISIT ADD ON: HCPCS

## 2024-08-01 PROCEDURE — 99214 OFFICE O/P EST MOD 30 MIN: CPT

## 2024-08-01 PROCEDURE — 84443 ASSAY THYROID STIM HORMONE: CPT

## 2024-08-01 PROCEDURE — 36415 COLL VENOUS BLD VENIPUNCTURE: CPT

## 2024-08-01 PROCEDURE — 85027 COMPLETE CBC AUTOMATED: CPT

## 2024-08-01 PROCEDURE — 80053 COMPREHEN METABOLIC PANEL: CPT

## 2024-08-01 RX ORDER — TRAZODONE HYDROCHLORIDE 50 MG/1
50-100 TABLET, FILM COATED ORAL
Qty: 90 TABLET | Refills: 3 | Status: SHIPPED | OUTPATIENT
Start: 2024-08-01 | End: 2024-09-04

## 2024-08-01 RX ORDER — DICYCLOMINE HYDROCHLORIDE 10 MG/1
10 CAPSULE ORAL 4 TIMES DAILY PRN
Qty: 60 CAPSULE | Refills: 1 | Status: SHIPPED | OUTPATIENT
Start: 2024-08-01

## 2024-08-01 NOTE — ASSESSMENT & PLAN NOTE
Patient presents today with worsening of balance. This has also correlated with both worsening of his previous tremor and a deterioration in his mental health. He has no associated vision changes, headaches, weakness. No issues with speech or dysphagia. He did have one fall without injury a few months ago that was mechanical in nature. He denies dizziness or vertigo like symptoms. His neurologic exam is overall reassuring. He does have a positive Romberg. He does appear unsteady but cranial nerves are intact as is his strength. I suggest we update labs today and I will follow up on these results. I also suggest we start with physical therapy for gait training and addressing his mental health as documented elsewhere. If symptoms are not improving in the coming weeks with these things, discussed I would have low threshold to obtain imaging of the brain and he is in agreement with this. Discussed reasons to return to care more expeditiously.

## 2024-08-01 NOTE — PROGRESS NOTES
Assessment & Plan   Problem List Items Addressed This Visit       Gastroesophageal reflux disease without esophagitis     Stable. Continue omeprazole.          Relevant Medications    dicyclomine (BENTYL) 10 MG capsule    omeprazole (PRILOSEC) 20 MG DR capsule    Insomnia    Relevant Medications    traZODone (DESYREL) 50 MG tablet    Irritable bowel syndrome     Stable. Continue Bentyl.         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 Orders placed.         Relevant Orders    Adult Audiology  Referral    Poor balance     Patient presents today with worsening of balance. This has also correlated with both worsening of his previous tremor and a deterioration in his mental health. He has no associated vision changes, headaches, weakness. No issues with speech or dysphagia. He did have one fall without injury a few months ago that was mechanical in nature. He denies dizziness or vertigo like symptoms. His neurologic exam is overall reassuring. He does have a positive Romberg. He does appear unsteady but cranial nerves are intact as is his strength. I suggest we update labs today and I will follow up on these results. I also suggest we start with physical therapy for gait training and addressing his mental health as documented elsewhere. If symptoms are not improving in the coming weeks with these things, discussed I would have low threshold to obtain imaging of the brain and he is in agreement with this. Discussed reasons to return to care more expeditiously.          Relevant Orders    Comprehensive metabolic panel (BMP + Alb, Alk Phos, ALT, AST, Total. Bili, TP)    CBC with Platelets and Reflex to Iron Studies (Completed)    Physical Therapy  Referral    Current severe episode of major depressive disorder without psychotic features without  prior episode (H)     Patient presents today with severe worsening of his mood.  As documented elsewhere, he recently lost his wife of 60+ years.  He was seen in the interim by myself and we discussed grief and insomnia.  Since then, he notes worsening of mood.  He has developed some fairly significant suicidal ideations and has found himself googling these things.  He wonders about physician assisted suicide, although he knows that this is not legal in the state [facility].  We discussed today that given what he has disclosed to me, I would recommend expedited evaluation in an emergency room.  However, he refuses this.  He is of sound mind. We discussed removing things that can be used to harm himself from the home.  He also notes today that his family is not aware of this; I strongly encouraged him to discuss this with his family members.  I have offered to discuss with his daughter and he is amenable to this. He would like me to call her tomorrow as they are planning on having supper this evening and he would like to discuss with her first. I have also contacted the [facility] and they should be contacting him shortly regarding setting up outpatient resources and again reviewing safety protocols with Floyd. He is aware that if at any point, he develops a concrete plan of harming himself he needs to be seen in an Emergency Room. We also set up a follow up appointment for the Monday after he returns from [place] for likely discussion on medication management. Patient expressed understanding of and agreement with this plan. All questions were answered.         Relevant Medications    traZODone (DESYREL) 50 MG tablet    Other Relevant Orders    TSH with free T4 reflex      The longitudinal plan of care for the diagnosis(es)/condition(s) as documented were addressed during this visit. Due to the added complexity in care, I will continue to support Floyd in the subsequent management and with ongoing  "continuity of care.    Subjective   Floyd is a 88 year old, presenting for the following health issues:  Balance/ Vestibular (Has to catch himself at times during day. Uses cane does help.), Depression (About wifes passing.), Medication Refill, Immunization (Consult.), and Suspected Hearing Decrease (Has aids.)        8/1/2024    10:10 AM   Additional Questions   Roomed by sac   Accompanied by self         8/1/2024    10:10 AM   Patient Reported Additional Medications   Patient reports taking the following new medications no     OV to discuss the following:    Decreased hearing in both ears. Not sure hearing aid is working anymore.    \"Off balance\" sensations. Started shortly after the passing of his wife 3 months ago. He had a fall without injury at that time. Symptoms seem to be worsening. He will oftentimes need to stop while walking and has an odd sensation of losing his balance. This seems to coincide with worsening of his tremor. Historically he has tried meclizine. This does help with symptoms. No headaches, no vision changes.     Continued grief. He is now sleeping well with the addition of trazodone. However, he feels significantly distressed. He is having thoughts of self harm/ending his life. He has even found himself googling this and assisted suicide. He has a good support system, primarily his daughter, however he has not shared this with anyone. He finds himself 'putting on a show.' Things are OK when he is around others but he finds the time where he is alone very difficult.    History of Present Illness       Reason for visit:  Balance  dizzy depression painknee back    He eats 0-1 servings of fruits and vegetables daily.He consumes 1 sweetened beverage(s) daily.He exercises with enough effort to increase his heart rate 9 or less minutes per day.  He exercises with enough effort to increase his heart rate 3 or less days per week. He is missing 1 dose(s) of medications per week.  He is not taking " prescribed medications regularly due to remembering to take.         Objective    /72 (BP Location: Left arm, Patient Position: Sitting, Cuff Size: Adult Regular)   Pulse 73   Temp 98.5  F (36.9  C) (Oral)   Resp 16   SpO2 96%   There is no height or weight on file to calculate BMI.    Physical Exam  Vitals and nursing note reviewed.   Constitutional:       Appearance: Normal appearance. He is not ill-appearing.   Cardiovascular:      Rate and Rhythm: Normal rate and regular rhythm.      Pulses: Normal pulses.      Heart sounds: Normal heart sounds. No murmur heard.  Pulmonary:      Effort: Pulmonary effort is normal. No respiratory distress.   Abdominal:      Tenderness: There is no right CVA tenderness.   Musculoskeletal:      Right lower leg: No edema.      Left lower leg: No edema.      Comments: 5/5 strength BUE and BLE   Neurological:      Mental Status: He is alert.      Cranial Nerves: Cranial nerves 2-12 are intact. No cranial nerve deficit or facial asymmetry.      Sensory: Sensation is intact.      Motor: No tremor or pronator drift.      Coordination: Romberg sign positive. Finger-Nose-Finger Test normal.      Gait: Gait abnormal (antalgic).      Deep Tendon Reflexes: Reflexes are normal and symmetric.            Signed Electronically by: LEELA Melendez CNP

## 2024-08-01 NOTE — TELEPHONE ENCOUNTER
----- Message from Evie Payne sent at 8/1/2024 11:16 AM CDT -----  Transition Clinic Referral   [place]       Please Check Type of Referral Requested:       __X__THERAPY: The Transition clinic is able to schedule patients without current medical insurance; these patient will be referred to our Social Work Care Coordinator for Medical Insurance              Assistance. We are open for referral for psychotherapy. Patient is referred from: PCP at [facility]       ____MEDICATION:   Referrals for Medication are ONLY accepted from the following areas (select):                                       Suboxone and Opioid Management Referrals are automatically denied.   TC Psychiatry cannot see patient without active medical insurance.   Next level of psychiatry care must be within 12 months.  TC Psychiatry cannot accept patient with next level of care scheduled with PCP.  The transition clinic cannot follow patients who are on a restricted recipient program.    __ Long-Acting Injection (LOVE)?    Is referred patient receiving a long-acting injection (LOVE)?  If YES, provide the following:    Name and dose of Medication:   Date Injection was last administered to patient:   Next Long- Acting injection Due Date:     Is referred patient transferring from [facility]?  If YES, provide the following:     ___  LOVE will be receiving LOVE [facility] in [place]  ___  LOVE will be administered per an IRTS or elsewhere in the community      GUARDIAN: If your patient is not their own Guardian, please provide the following:    Guardian Name:  Guardian Contact Information (Phone & Email) :  Guardian Address:     FOSTER CARE PROVIDER: If your patient lives at a Licensed Foster Care, please provide the following:    Foster Provider:  Foster Provider Contact Information (Phone & Email):  Foster Provider Address:     Referring Provider Contact Name: Evie ROLON; Phone Number:  [phone]    Reason for Transition Clinic Referral: Patient presents with severe depression and suicidal ideation. He has recently lost his wife. He has limited social support. He was recommended to present to an Emergency Room but declined. Needs expedited services for stabilization.     Next Level of Care Patient Will Be Transitioned To:   Provider(s)  Location   Date/Time     What Would Be Helpful from the Transition Clinic: see above. Safety evaluation, resources for mental health, expedited assessment.     Needs:NO    Does Patient Have Access to Technology: yes    Patient E-mail Address: [email or web address]    Current Patient Phone Number: [phone];     Clinician Gender Preference (if applicable): NO    Patient location preference:In person    LEELA Melendez CNP    (Master Form: Updated 11/28/2023)

## 2024-08-01 NOTE — TELEPHONE ENCOUNTER
----- Message from Evie Payne sent at 8/1/2024 11:16 AM CDT -----  Transition Clinic Referral   [place]       Please Check Type of Referral Requested:       __X__THERAPY: The Transition clinic is able to schedule patients without current medical insurance; these patient will be referred to our Social Work Care Coordinator for Medical Insurance              Assistance. We are open for referral for psychotherapy. Patient is referred from: PCP at [facility]       ____MEDICATION:   Referrals for Medication are ONLY accepted from the following areas (select):                                       Suboxone and Opioid Management Referrals are automatically denied.   TC Psychiatry cannot see patient without active medical insurance.   Next level of psychiatry care must be within 12 months.  TC Psychiatry cannot accept patient with next level of care scheduled with PCP.  The transition clinic cannot follow patients who are on a restricted recipient program.    __ Long-Acting Injection (LOVE)?    Is referred patient receiving a long-acting injection (LOVE)?  If YES, provide the following:    Name and dose of Medication:   Date Injection was last administered to patient:   Next Long- Acting injection Due Date:     Is referred patient transferring from [facility]?  If YES, provide the following:     ___  LOVE will be receiving OLVE at the [facility] in [place]  ___  LOVE will be administered per an IRTS or elsewhere in the community      GUARDIAN: If your patient is not their own Guardian, please provide the following:    Guardian Name:  Guardian Contact Information (Phone & Email) :  Guardian Address:     FOSTER CARE PROVIDER: If your patient lives at a Licensed Foster Care, please provide the following:    Foster Provider:  Foster Provider Contact Information (Phone & Email):  Foster Provider Address:     Referring Provider Contact Name: Evie ROLON; Phone Number:  [phone]    Reason for Transition Clinic Referral: Patient presents with severe depression and suicidal ideation. He has recently lost his wife. He has limited social support. He was recommended to present to an Emergency Room but declined. Needs expedited services for stabilization.     Next Level of Care Patient Will Be Transitioned To:   Provider(s)  Location   Date/Time     What Would Be Helpful from the Transition Clinic: see above. Safety evaluation, resources for mental health, expedited assessment.     Needs:NO    Does Patient Have Access to Technology: yes    Patient E-mail Address: [email or web address]    Current Patient Phone Number: [phone];     Clinician Gender Preference (if applicable): NO    Patient location preference:In person    LEELA Melendez CNP    (Master Form: Updated 11/28/2023)

## 2024-08-01 NOTE — TELEPHONE ENCOUNTER
Writer gave patient a phone call to schedule TC therapy appointment. Patient requested a call back at noon today. Referral pending.    Shantelle Larry  08/01/2024  [phone]

## 2024-08-01 NOTE — TELEPHONE ENCOUNTER
Writer gave patient a phone call and explained TC is not accepting health insurance. Writer sent patient an email with clinics/therapist who take [employer] health insurance.    Shantelle Larry  08/01/2024  [phone]

## 2024-08-01 NOTE — ASSESSMENT & PLAN NOTE
Patient presents today with severe worsening of his mood.  As documented elsewhere, he recently lost his wife of 60+ years.  He was seen in the interim by myself and we discussed grief and insomnia.  Since then, he notes worsening of mood.  He has developed some fairly significant suicidal ideations and has found himself googling these things.  He wonders about physician assisted suicide, although he knows that this is not legal in the state [facility].  We discussed today that given what he has disclosed to me, I would recommend expedited evaluation in an emergency room.  However, he refuses this.  He is of sound mind. We discussed removing things that can be used to harm himself from the home.  He also notes today that his family is not aware of this; I strongly encouraged him to discuss this with his family members.  I have offered to discuss with his daughter and he is amenable to this. He would like me to call her tomorrow as they are planning on having supper this evening and he would like to discuss with her first. I have also contacted the [facility] and they should be contacting him shortly regarding setting up outpatient resources and again reviewing safety protocols with Floyd. He is aware that if at any point, he develops a concrete plan of harming himself he needs to be seen in an Emergency Room. We also set up a follow up appointment for the Monday after he returns from [place] for likely discussion on medication management. Patient expressed understanding of and agreement with this plan. All questions were answered.

## 2024-08-02 LAB
ALBUMIN SERPL BCG-MCNC: 4.1 G/DL (ref 3.5–5.2)
ALP SERPL-CCNC: 53 U/L (ref 40–150)
ALT SERPL W P-5'-P-CCNC: 17 U/L (ref 0–70)
ANION GAP SERPL CALCULATED.3IONS-SCNC: 10 MMOL/L (ref 7–15)
AST SERPL W P-5'-P-CCNC: 21 U/L (ref 0–45)
BILIRUB SERPL-MCNC: 0.7 MG/DL
BUN SERPL-MCNC: 16.3 MG/DL (ref 8–23)
CALCIUM SERPL-MCNC: 9.1 MG/DL (ref 8.8–10.4)
CHLORIDE SERPL-SCNC: 104 MMOL/L (ref 98–107)
CREAT SERPL-MCNC: 0.9 MG/DL (ref 0.67–1.17)
EGFRCR SERPLBLD CKD-EPI 2021: 83 ML/MIN/1.73M2
GLUCOSE SERPL-MCNC: 111 MG/DL (ref 70–99)
HCO3 SERPL-SCNC: 25 MMOL/L (ref 22–29)
POTASSIUM SERPL-SCNC: 4.1 MMOL/L (ref 3.4–5.3)
PROT SERPL-MCNC: 6 G/DL (ref 6.4–8.3)
SODIUM SERPL-SCNC: 139 MMOL/L (ref 135–145)
TSH SERPL DL<=0.005 MIU/L-ACNC: 1.5 UIU/ML (ref 0.3–4.2)

## 2024-08-19 ENCOUNTER — OFFICE VISIT (OUTPATIENT)
Dept: FAMILY MEDICINE | Facility: CLINIC | Age: 89
End: 2024-08-19
Payer: COMMERCIAL

## 2024-08-19 VITALS
RESPIRATION RATE: 12 BRPM | BODY MASS INDEX: 25.08 KG/M2 | SYSTOLIC BLOOD PRESSURE: 132 MMHG | HEART RATE: 55 BPM | DIASTOLIC BLOOD PRESSURE: 80 MMHG | HEIGHT: 69 IN | WEIGHT: 169.31 LBS | TEMPERATURE: 98.3 F | OXYGEN SATURATION: 96 %

## 2024-08-19 DIAGNOSIS — E78.2 HYPERLIPEMIA, MIXED: ICD-10-CM

## 2024-08-19 DIAGNOSIS — G89.29 CHRONIC PAIN OF RIGHT KNEE: ICD-10-CM

## 2024-08-19 DIAGNOSIS — F43.21 GRIEF: ICD-10-CM

## 2024-08-19 DIAGNOSIS — F32.2 CURRENT SEVERE EPISODE OF MAJOR DEPRESSIVE DISORDER WITHOUT PSYCHOTIC FEATURES WITHOUT PRIOR EPISODE (H): Primary | ICD-10-CM

## 2024-08-19 DIAGNOSIS — M25.561 CHRONIC PAIN OF RIGHT KNEE: ICD-10-CM

## 2024-08-19 PROCEDURE — G2211 COMPLEX E/M VISIT ADD ON: HCPCS

## 2024-08-19 PROCEDURE — 96127 BRIEF EMOTIONAL/BEHAV ASSMT: CPT

## 2024-08-19 PROCEDURE — 99214 OFFICE O/P EST MOD 30 MIN: CPT

## 2024-08-19 RX ORDER — ESCITALOPRAM OXALATE 5 MG/1
5 TABLET ORAL DAILY
Qty: 90 TABLET | Refills: 3 | Status: SHIPPED | OUTPATIENT
Start: 2024-08-19

## 2024-08-19 ASSESSMENT — ANXIETY QUESTIONNAIRES
1. FEELING NERVOUS, ANXIOUS, OR ON EDGE: NEARLY EVERY DAY
4. TROUBLE RELAXING: NEARLY EVERY DAY
7. FEELING AFRAID AS IF SOMETHING AWFUL MIGHT HAPPEN: SEVERAL DAYS
6. BECOMING EASILY ANNOYED OR IRRITABLE: SEVERAL DAYS
IF YOU CHECKED OFF ANY PROBLEMS ON THIS QUESTIONNAIRE, HOW DIFFICULT HAVE THESE PROBLEMS MADE IT FOR YOU TO DO YOUR WORK, TAKE CARE OF THINGS AT HOME, OR GET ALONG WITH OTHER PEOPLE: SOMEWHAT DIFFICULT
8. IF YOU CHECKED OFF ANY PROBLEMS, HOW DIFFICULT HAVE THESE MADE IT FOR YOU TO DO YOUR WORK, TAKE CARE OF THINGS AT HOME, OR GET ALONG WITH OTHER PEOPLE?: SOMEWHAT DIFFICULT
GAD7 TOTAL SCORE: 14
5. BEING SO RESTLESS THAT IT IS HARD TO SIT STILL: NOT AT ALL
GAD7 TOTAL SCORE: 14
2. NOT BEING ABLE TO STOP OR CONTROL WORRYING: NEARLY EVERY DAY
3. WORRYING TOO MUCH ABOUT DIFFERENT THINGS: NEARLY EVERY DAY
7. FEELING AFRAID AS IF SOMETHING AWFUL MIGHT HAPPEN: SEVERAL DAYS

## 2024-08-19 NOTE — PROGRESS NOTES
Assessment & Plan   Problem List Items Addressed This Visit       Hyperlipemia, mixed     Patient has opted to discontinue his Lipitor.  He is aware of the risk, primarily in the form of cardiovascular events, that, discontinue this medication and is comfortable with this.  EHR updated.         Grief    Relevant Medications    escitalopram (LEXAPRO) 5 MG tablet    Other Relevant Orders    Adult Mental Health  Referral    Current severe episode of major depressive disorder without psychotic features without prior episode (H) - Primary     Patient presents today in follow-up as a pertains to mental health.  He continues to struggle greatly with the recent loss of his wife.  He has struggled intermittently with thoughts of self-harm/suicide.  Today, he denies any plan of such.  He endorses safety.  I have placed an urgent referral to talk therapy, as for some reason the transition clinic was unable to assist him at our last visit.  Additionally, he notes that his daughter recently found an outpatient mental health facility, although it is unclear if this is simply psychotherapy versus psychiatry as well.  I do think that based on his description of his mood, he would benefit from a trial of a low-dose SSRI and he is amenable to this.  Prescription for 5 mg escitalopram sent to pharmacy.  Expected therapeutic effects and potential side effects were discussed.  Crisis information provided in after visit summary and he is encouraged to follow-up with any additional needs.  I have asked that he follow-up in clinic with me in 30 days or sooner if needed.  He is aware that if at any point, he develops plans for self-harm or suicide he needs to present to an emergency room.  Patient expressed understanding of and agreement with this plan.  All questions were answered.         Relevant Medications    escitalopram (LEXAPRO) 5 MG tablet    Other Relevant Orders    Adult Mental Health  Referral    Chronic pain  of right knee     Interested in additional interventions with orthopedics. Feels as if pain is contributing to issues with balance which I am in agreement with. Referral to [facility] placed today.          Relevant Orders    Orthopedic  Referral      The longitudinal plan of care for the diagnosis(es)/condition(s) as documented were addressed during this visit. Due to the added complexity in care, I will continue to support Floyd in the subsequent management and with ongoing continuity of care.    Subjective   Floyd is a 88 year old, presenting for the following health issues:  Follow Up (Headaches, Off balance, hard time hearing. Feels isolated, does not know anybody.)        8/19/2024     1:53 PM   Additional Questions   Roomed by sac   Accompanied by self         8/19/2024     1:53 PM   Patient Reported Additional Medications   Patient reports taking the following new medications no     Follow up  Had [facility] [place] [facility] in [place]. Went well but 'opened up some wounds.' He drove to [place] by himself and found this was too isolating for him.  He plans to start a grief group in early September in [place].   Audiology appointment scheduled for October  His daughter has been helping him look into mental health resources/ She just found one yesterday that appears to be covered by his insurance  Still notes that most nights, he goes to sleep hoping that he will not wake up. No plans of self harm or suicide.   Additionally, hoping for referral to orthopedics. His right knee has been particularly bothersome. He has PT appointment scheduled for gait training in the coming weeks but daughter has recommended an injection.    History of Present Illness       Back Pain:  He presents for follow up of back pain. Patient's back pain is a chronic problem.  Location of back pain:  Right middle of back  Description of back pain: dull ache  Back pain spreads: nowhere    Since patient first noticed back pain, pain is: gradually  "worsening  Does back pain interfere with his job:  No       Mental Health Follow-up:  Patient presents to follow-up on Depression & Anxiety.Patient's depression since last visit has been:  Worse  The patient is not having other symptoms associated with depression.  Patient's anxiety since last visit has been:  Worse  The patient is not having other symptoms associated with anxiety.  Any significant life events: grief or loss  Patient is not feeling anxious or having panic attacks.  Patient has no concerns about alcohol or drug use.    Headaches:   Since the patient's last clinic visit, headaches are: no change  The patient is getting headaches:  Once every 2 to 3 days  He is able to do normal daily activities when he has a migraine.  The patient is taking the following rescue/relief medications:  Ibuprofen (Advil, Motrin) and Tylenol   Patient states \"I get some relief\" from the rescue/relief medications.   The patient is taking the following medications to prevent migraines:  No medications to prevent migraines  In the past 4 weeks, the patient has gone to an Urgent Care or Emergency Room 0 times times due to headaches.    He eats 0-1 servings of fruits and vegetables daily.He consumes 1 sweetened beverage(s) daily.He exercises with enough effort to increase his heart rate 9 or less minutes per day.  He exercises with enough effort to increase his heart rate 3 or less days per week. He is missing 1 dose(s) of medications per week.       Objective    /80 (BP Location: Left arm, Patient Position: Sitting, Cuff Size: Adult Regular)   Pulse 55   Temp 98.3  F (36.8  C) (Oral)   Resp 12   Ht 1.74 m (5' 8.5\")   Wt 76.8 kg (169 lb 5 oz)   SpO2 96%   BMI 25.37 kg/m    Body mass index is 25.37 kg/m .    Physical Exam  Vitals and nursing note reviewed.   Constitutional:       General: He is not in acute distress.     Appearance: Normal appearance.   Cardiovascular:      Rate and Rhythm: Normal rate and regular " rhythm.   Pulmonary:      Effort: Pulmonary effort is normal. No respiratory distress.   Neurological:      Mental Status: He is alert.   Psychiatric:         Attention and Perception: Attention normal.         Mood and Affect: Mood is anxious. Affect is tearful.         Speech: Speech normal.         Behavior: Behavior normal. Behavior is cooperative.         Thought Content: Thought content includes suicidal ideation. Thought content does not include homicidal ideation. Thought content does not include homicidal or suicidal plan.         Cognition and Memory: Cognition normal.         Judgment: Judgment normal.            Signed Electronically by: LEELA Melendez CNP

## 2024-08-19 NOTE — ASSESSMENT & PLAN NOTE
Interested in additional interventions with orthopedics. Feels as if pain is contributing to issues with balance which I am in agreement with. Referral to [facility] placed today.

## 2024-08-19 NOTE — PATIENT INSTRUCTIONS
Follow up with me in one month  Start low dose Lexapro  [facility] for knee injection  Follow up with therapy/psychiatry as insurance allows

## 2024-08-19 NOTE — ASSESSMENT & PLAN NOTE
Patient has opted to discontinue his Lipitor.  He is aware of the risk, primarily in the form of cardiovascular events, that, discontinue this medication and is comfortable with this.  EHR updated.

## 2024-08-19 NOTE — ASSESSMENT & PLAN NOTE
Patient presents today in follow-up as a pertains to mental health.  He continues to struggle greatly with the recent loss of his wife.  He has struggled intermittently with thoughts of self-harm/suicide.  Today, he denies any plan of such.  He endorses safety.  I have placed an urgent referral to talk therapy, as for some reason the transition clinic was unable to assist him at our last visit.  Additionally, he notes that his daughter recently found an outpatient mental health facility, although it is unclear if this is simply psychotherapy versus psychiatry as well.  I do think that based on his description of his mood, he would benefit from a trial of a low-dose SSRI and he is amenable to this.  Prescription for 5 mg escitalopram sent to pharmacy.  Expected therapeutic effects and potential side effects were discussed.  Crisis information provided in after visit summary and he is encouraged to follow-up with any additional needs.  I have asked that he follow-up in clinic with me in 30 days or sooner if needed.  He is aware that if at any point, he develops plans for self-harm or suicide he needs to present to an emergency room.  Patient expressed understanding of and agreement with this plan.  All questions were answered.

## 2024-08-29 ENCOUNTER — TELEPHONE (OUTPATIENT)
Dept: FAMILY MEDICINE | Facility: CLINIC | Age: 89
End: 2024-08-29
Payer: COMMERCIAL

## 2024-08-29 NOTE — TELEPHONE ENCOUNTER
General Call    Contacts       Contact Date/Time Type Contact Phone/Fax    08/29/2024 02:09 PM CDT Phone (Incoming) Floyd Fong (Self) [phone] (M)          Reason for Call:  PCP is leaving 01/01/2025 Pt would like recommendations on new PCP.     What are your questions or concerns:  Pt is concerned that he can't find a provider that takes [employer].     Date of last appointment with provider: 08/19/2024    Could we send this information to you in [employer] or would you prefer to receive a phone call?:   Patient would prefer a phone call   Okay to leave a detailed message?: Yes at Cell number on file:    Telephone Information:   Mobile [phone]

## 2024-08-29 NOTE — TELEPHONE ENCOUNTER
Spoke with Floyd to clarify. His insurance plan will have a network change effective 1/1/2025 and [facility] will no longer be covered. Patient would like to know if Evie Payne has any recommendations of providers at [facility].    Requesting response via [employer] message.    Patient would like to thank Evie and care team for their excellent care and will keep all scheduled appointments until the end of the year.

## 2024-09-04 ENCOUNTER — MYC REFILL (OUTPATIENT)
Dept: FAMILY MEDICINE | Facility: CLINIC | Age: 89
End: 2024-09-04
Payer: COMMERCIAL

## 2024-09-04 DIAGNOSIS — G47.00 INSOMNIA, UNSPECIFIED TYPE: ICD-10-CM

## 2024-09-04 DIAGNOSIS — I48.92 ATRIAL FLUTTER, UNSPECIFIED TYPE (H): ICD-10-CM

## 2024-09-05 RX ORDER — TRAZODONE HYDROCHLORIDE 50 MG/1
50-100 TABLET, FILM COATED ORAL
Qty: 90 TABLET | Refills: 2 | Status: SHIPPED | OUTPATIENT
Start: 2024-09-05

## 2024-09-05 RX ORDER — RAMIPRIL 5 MG/1
5 CAPSULE ORAL DAILY
Qty: 90 CAPSULE | Refills: 2 | Status: SHIPPED | OUTPATIENT
Start: 2024-09-05

## 2024-09-10 ENCOUNTER — THERAPY VISIT (OUTPATIENT)
Dept: PHYSICAL THERAPY | Facility: REHABILITATION | Age: 89
End: 2024-09-10
Payer: COMMERCIAL

## 2024-09-10 DIAGNOSIS — R26.89 POOR BALANCE: ICD-10-CM

## 2024-09-10 PROCEDURE — 97161 PT EVAL LOW COMPLEX 20 MIN: CPT | Mod: GP | Performed by: PHYSICAL THERAPIST

## 2024-09-10 PROCEDURE — 97112 NEUROMUSCULAR REEDUCATION: CPT | Mod: GP | Performed by: PHYSICAL THERAPIST

## 2024-09-10 PROCEDURE — 97535 SELF CARE MNGMENT TRAINING: CPT | Mod: GP | Performed by: PHYSICAL THERAPIST

## 2024-09-10 NOTE — PROGRESS NOTES
PHYSICAL THERAPY EVALUATION  Type of Visit: Evaluation        Fall Risk Screen:  Fall screen completed by: PT  Have you fallen 2 or more times in the past year?: Yes  Have you fallen and had an injury in the past year?: No  Is patient a fall risk?: Yes    Subjective       Presenting condition or subjective complaint: balance and falls and back pain  Pt reports he moved here from [place] a year ago and his wife passed away 3 months ago. He has a small dog at home.  Pt reports since his wife passed away he has struggled with insomnia - so he was trying a medication for sleeping and doesn't know if that has contributed to his increase in falls and balance challenges.   Pt reports that he has started falling more this spring and summer. Pt reports he started to use a cane about 2 months and has felt more steady with this.     Date of onset: 05/01/24    Relevant medical history: Depression   Dates & types of surgery: open heart    Prior diagnostic imaging/testing results:       Prior therapy history for the same diagnosis, illness or injury: No      Prior Level of Function  Transfers: Assistive equipment  Ambulation: Assistive equipment  ADL: Assistive equipment      Living Environment  Social support: Alone   Type of home: Apartment/condo; 1 level   Stairs to enter the home:         Ramp: No   Stairs inside the home: No       Help at home: None  Equipment owned: Straight Cane     Employment:      Hobbies/Interests:      Patient goals for therapy: better balance and less back pain    Pain assessment:  Severe in the morning, less throughout the day     Objective      Cognitive Status Examination  Orientation: Oriented to person, place and time   Level of Consciousness: Alert  Follows Commands and Answers Questions: 100% of the time  Personal Safety and Judgement: Intact  Memory: Intact    POSTURE:  Forward head, rounded shoulders, increased thoracic kyphosis  RANGE OF MOTION:  Trunk ROM limited  STRENGTH:  B hip flexors 4/5,  B quad 5/5, B DF 5/5    TRANSFERS:  Pt demo's regular need of UE for transferring sit to stand with increased forward trunk bend until knee extension straightened - demo'ing poor trunk/core stability      GAIT:   Level of Rogers: Independent  Assistive Device(s): Cane (single end)  Gait Deviations:  Severe R knee valgus with B trendelenberg    BALANCE:  APTA sit to stand 6 reps in 30 seconds, B SLS 2-3 seconds    Assessment & Plan   CLINICAL IMPRESSIONS  Medical Diagnosis: Poor balance (R26.89)    Treatment Diagnosis: Poor balance (R26.89), muscle weakness, impaired gait quality   Impression/Assessment: Patient is a 88 year old male with recent concern of balance and falls as well as back pain more am > pm.  Pt demo's at risk for falls with testing below age/gender norms as well as decreased trunk/core and hip strength and gait impairments. Pt also might be having increased balance challenges related to medications that he is trying to trouble shoot. The following significant findings have been identified: Pain, Decreased ROM/flexibility, Decreased strength, Impaired balance, Impaired gait, Impaired muscle performance, Decreased activity tolerance, Impaired posture, and Dizziness. These impairments interfere with their ability to perform self care tasks, recreational activities, household chores, household mobility, and community mobility as compared to previous level of function.     Clinical Decision Making (Complexity):  Clinical Presentation: Stable/Uncomplicated  Clinical Presentation Rationale: based on medical and personal factors listed in PT evaluation  Clinical Decision Making (Complexity): Low complexity    PLAN OF CARE  Treatment Interventions:  Interventions: Gait Training, Manual Therapy, Neuromuscular Re-education, Therapeutic Activity, Therapeutic Exercise, Self-Care/Home Management    Long Term Goals     PT Goal 1  Goal Description: Pt will be able to improve balance by increased APTA sit to  stand reps for decreased risk of falls in 90 days.  Target Date: 12/07/24  PT Goal 2  Goal Description: Pt will be able to rate morning back pain and stiffness at 3/10 or less after morning stretch routine for improved QOL in 90 days.  Target Date: 12/07/24      Frequency of Treatment: 1x/week  Duration of Treatment: 90 days    Risks and benefits of evaluation/treatment have been explained.   Patient/Family/caregiver agrees with Plan of Care.     Evaluation Time:     PT Eval, Low Complexity Minutes (78834): 20       Signing Clinician: Luana Sanchez PT, DPT, CLT      [facility]                                                                                   OUTPATIENT PHYSICAL THERAPY      PLAN OF TREATMENT FOR OUTPATIENT REHABILITATION   Patient's Last Name, First Name, Floyd Barrett YOB: 1935   Provider's Name   [facility]   Medical Record No.  [id number]     Onset Date: 05/01/24  Start of Care Date: 09/10/24     Medical Diagnosis:  Poor balance (R26.89)      PT Treatment Diagnosis:  Poor balance (R26.89), muscle weakness, impaired gait quality Plan of Treatment  Frequency/Duration: 1x/week/ 90 days    Certification date from 09/10/24 to 12/07/24         See note for plan of treatment details and functional goals     Luana Sanchez PT, DPT, CLT                         I CERTIFY THE NEED FOR THESE SERVICES FURNISHED UNDER        THIS PLAN OF TREATMENT AND WHILE UNDER MY CARE     (Physician attestation of this document indicates review and certification of the therapy plan).              Referring Provider:  Evie SWENSON CNP    Initial Assessment  See Epic Evaluation- Start of Care Date: 09/10/24

## 2024-09-10 NOTE — PATIENT INSTRUCTIONS
Sleep strategies    Cold, dark room  20-30 minutes of exercise per day  Decrease brighter light stimulation 1-2 hours before bed  Trial of 20 deep breaths per day - inhale 4-5 seconds in nose and exhale 5-6 seconds  Exposing yourself to morning sunshine as the sun is coming up on the horizon for 15-20 minutes  Trying to have your night time shower have a couple of minutes of cooler/cold water to stimulate body for sleep    Try trazadone before going to bed at night

## 2024-09-17 ENCOUNTER — TELEPHONE (OUTPATIENT)
Dept: FAMILY MEDICINE | Facility: CLINIC | Age: 89
End: 2024-09-17

## 2024-09-17 DIAGNOSIS — M19.90 ARTHRITIS: Primary | ICD-10-CM

## 2024-09-17 RX ORDER — MELOXICAM 7.5 MG/1
7.5 TABLET ORAL DAILY
Qty: 7 TABLET | Refills: 1 | Status: SHIPPED | OUTPATIENT
Start: 2024-09-17

## 2024-09-17 NOTE — TELEPHONE ENCOUNTER
General Call    Contacts       Contact Date/Time Type Contact Phone/Fax    09/17/2024 09:15 AM CDT Phone (Incoming) Floyd Fong (Self) [phone] (M)          Reason for Call:  Medication Request    What are your questions or concerns:  Patient calling to request refill on medication last prescribed by PCP in [place]. He reports he was prescribed meloxicam 15mg for arthritic flare-ups. Patient reports he has been moving and is experiencing another flare in his back and hips.     Last OV 8/19/24. Please advise if visit is needed.    Could we send this information to you in [employer] or would you prefer to receive a phone call?:   Patient would prefer a phone call   Okay to leave a detailed message?: Yes at Cell number on file:    Telephone Information:   Mobile [phone]

## 2024-09-17 NOTE — TELEPHONE ENCOUNTER
Called Floyd and relayed message as written.  He has no questions at this time and stated he will take medication until he has relief.

## 2024-09-17 NOTE — TELEPHONE ENCOUNTER
I think it is reasonable to do a low dose of meloxicam given his normal kidney function. However, he is on Lexapro which when taken in conjunction with meloxicam can increase risk of gastrointestinal bleeding. Please educate on the signs of this and ensure he takes with food. Additionally, it can affect kidneys and blood pressure but generally this is if taken for extensive time periods. I will send in a week of low dose meloxicam. He should not take any other anti-inflammatories such as Advil or Ibuprofen. Let me know if he has questions.   LEELA Melendez CNP on 9/17/2024 at 9:33 AM

## 2024-09-24 ENCOUNTER — ALLIED HEALTH/NURSE VISIT (OUTPATIENT)
Dept: FAMILY MEDICINE | Facility: CLINIC | Age: 89
End: 2024-09-24
Payer: COMMERCIAL

## 2024-09-24 ENCOUNTER — TELEPHONE (OUTPATIENT)
Dept: FAMILY MEDICINE | Facility: CLINIC | Age: 89
End: 2024-09-24

## 2024-09-24 VITALS — OXYGEN SATURATION: 97 % | SYSTOLIC BLOOD PRESSURE: 116 MMHG | DIASTOLIC BLOOD PRESSURE: 76 MMHG | HEART RATE: 82 BPM

## 2024-09-24 DIAGNOSIS — I10 BENIGN ESSENTIAL HYPERTENSION: Primary | ICD-10-CM

## 2024-09-24 PROCEDURE — 99207 PR NO CHARGE NURSE ONLY: CPT

## 2024-09-24 RX ORDER — ACETAMINOPHEN 500 MG
1000 TABLET ORAL 2 TIMES DAILY
COMMUNITY

## 2024-09-24 RX ORDER — IBUPROFEN 200 MG
200 TABLET ORAL 2 TIMES DAILY
COMMUNITY

## 2024-09-24 NOTE — TELEPHONE ENCOUNTER
Call to daughter Gianna. States patient is asymptomatic with elevated BP reading (cannot recall what the reading was at [facility] clinic).     Scheduled patient for nurse only BP check this afternoon.

## 2024-09-24 NOTE — TELEPHONE ENCOUNTER
General Call    Contacts       Contact Date/Time Type Contact Phone/Fax    09/24/2024 11:12 AM CDT Phone (Incoming) Gianna Fong (Emergency Contact) [phone] (H)          Reason for Call:  BP concerns    What are your questions or concerns:  9/23/24 [facility]- was alarmed at how high his BP was. He can't remember what it was and can't find the paper it was written down on. Was told to contact PCP in regards to this    Date of last appointment with provider: 8/19/2024    Could we send this information to you in [employer] or would you prefer to receive a phone call?:   Patient would prefer a phone call   Okay to leave a detailed message?: Yes at Cell number on file:    Telephone Information:   Mobile [phone] (daughter Gianna)

## 2024-09-24 NOTE — PROGRESS NOTES
I met with Floyd Fong at the request of self to recheck his blood pressure.  Blood pressure medications on the med list were reviewed with patient.    Patient has taken all medications as per usual regimen: Yes  Patient reports tolerating them without any issues or concerns: Yes    Vitals:    09/24/24 1409   BP: 116/76   BP Location: Left arm   Patient Position: Sitting   Cuff Size: Adult Regular   Pulse: 82   SpO2: 97%       Blood pressure was taken, previous encounter was reviewed, recorded blood pressure below 140/90.  Patient was discharged and the note will be sent to the provider for final review.      Patient states he had his ears cleaned yesterday and the provider recommended he have his BP rechecked as it was in the 140's systolic.  States after seeing BP reading in clinic today he feels the ear cleaning may have caused his elevated BP, especially as past ear cleanings have been painful at times.  RN agreed anticipation of pain and/or discomfort with ear wash most likely cause of isolated elevated BP reading.  Patient scheduled for his AWV with Evie Payne CNP on 12/16/24 and feels comfortable monitoring until appointment.          Debbie Miller RN  [facility]

## 2024-10-08 ENCOUNTER — TELEPHONE (OUTPATIENT)
Dept: SCHEDULING | Facility: CLINIC | Age: 89
End: 2024-10-08
Payer: COMMERCIAL

## 2024-10-08 NOTE — TELEPHONE ENCOUNTER
Pt called back 2:45p 10.8.24, vfd appt for 12/6/24 is fine, pt said will be switching to [facility] advtg for 1/2025, vfd we will be accepting that insurance, pt understood and no further questions; S.S. krystle supv

## 2024-10-08 NOTE — TELEPHONE ENCOUNTER
General Call    Contacts       Contact Date/Time Type Contact Phone/Fax    10/08/2024 02:36 PM CDT Phone (Outgoing) Floyd Fong (Self) [phone] (M)    Left Message -  called pt 235p 10.8.24 to answer questions about non payer insurance, lft msg on vcmail may call me back at  or email [email or web address] with any questions; S.S csol supv          Reason for Call:  called pt 235p 10.8.24 to answer questions about non payer insurance, lft msg on vcmail may call me back at  or email [email or web address] with any questions; S.S csol supv     What are your questions or concerns:  above     Date of last appointment with provider: na    Could we send this information to you in Health system or would you prefer to receive a phone call?:   If patient calls back with related questions, you may transfer to me at  [phone] M-F or refer to email [email or web address]

## 2024-10-16 ENCOUNTER — OFFICE VISIT (OUTPATIENT)
Dept: AUDIOLOGY | Facility: CLINIC | Age: 89
End: 2024-10-16
Payer: COMMERCIAL

## 2024-10-16 DIAGNOSIS — H91.93 DECREASED HEARING OF BOTH EARS: ICD-10-CM

## 2024-10-16 DIAGNOSIS — H90.3 SENSORINEURAL HEARING LOSS (SNHL) OF BOTH EARS: Primary | ICD-10-CM

## 2024-10-16 PROCEDURE — 92557 COMPREHENSIVE HEARING TEST: CPT | Performed by: AUDIOLOGIST

## 2024-10-16 PROCEDURE — 92550 TYMPANOMETRY & REFLEX THRESH: CPT | Performed by: AUDIOLOGIST

## 2024-10-16 NOTE — PROGRESS NOTES
AUDIOLOGY REPORT    SUBJECTIVE:  Floyd Fong is a 89 year old male who was seen in the Audiology Clinic at the [facility] for audiologic evaluation, referred by LEELA Melendez, CNP.    Patient reports decreased hearing for many years. He struggles to hear in background noise the most. He is currently wearing his second pair of hearing aids that he got from [employer] [facility] about 4 years ago. He reports frequent loss of balance and ambulates with a cane.  He has had 2+ falls that his doctor is aware of and denies serious injuries from the falls. He reports his mother had hearing loss.  He denies otalgia, otorrhea, aural fullness, tinnitus, past ear surgery, and noise exposure.    OBJECTIVE:  Abuse Screening:  Do you feel unsafe at home or work/school? No  Do you feel threatened by someone? No  Does anyone try to keep you from having contact with others, or doing things outside of your home? No  Physical signs of abuse present? No     Fall Risk Screen:  1. Have you fallen two or more times in the past year? No  2. Have you fallen and had an injury in the past year? No    Timed Up and Go Score (in seconds): not tested  Is patient a fall risk? No  Referral initiated: No  Fall Risk Assessment Completed by Audiology    Otoscopic exam indicates mild wax in right ear and clear left ear.      Pure Tone Thresholds assessed using conventional audiometry with good  reliability from 250-8000 Hz bilaterally using insert earphones and circumaural headphones     RIGHT:  moderate to severe sensorineural hearing loss    LEFT:  moderate to severe sensorineural hearing loss    Tympanogram:    RIGHT: normal eardrum mobility    LEFT:   normal eardrum mobility    Reflexes (reported by stimulus ear):  RIGHT: Ipsilateral is present at normal levels  RIGHT: Contralateral is absent at frequencies tested  LEFT:   Ipsilateral is absent at frequencies tested  LEFT:   Contralateral is present at normal levels      Speech  Reception Threshold:    RIGHT: 45 dB HL    LEFT:   70 dB HL    NOTE: completed with circumaural headphones which seemed to be collapsing his ear canals. Testing was not repeated with inserts.  Word recognition scores were repeated with inserts and showed significant improvement in scores.     Word Recognition Score:     RIGHT: 68% at 85 dB HL using NU-6 recorded word list.    LEFT:   56% at 90 dB HL using NU-6 recorded word list.      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 Today s results were discussed with the patient in detail.     PLAN:  Patient was counseled regarding hearing loss and impact on communication.  Patient expressed interest in new hearing aids.  It is recommended that the patient return for hearing aid consultation. Appointments have been scheduled.  Please call this clinic with questions regarding these results or recommendations.      Juan Kimball, CCC-A  [place] Licensed Audiologist #[id number]

## 2024-10-29 ENCOUNTER — OFFICE VISIT (OUTPATIENT)
Dept: AUDIOLOGY | Facility: CLINIC | Age: 89
End: 2024-10-29
Payer: COMMERCIAL

## 2024-10-29 DIAGNOSIS — H90.3 SENSORINEURAL HEARING LOSS (SNHL) OF BOTH EARS: Primary | ICD-10-CM

## 2024-10-29 PROCEDURE — 92591 PR HEARING AID EXAM BINAURAL: CPT | Performed by: AUDIOLOGIST

## 2024-11-07 NOTE — PROGRESS NOTES
AUDIOLOGY REPORT    SUBJECTIVE: Flyod Fong is a 89 year old male was seen in the Audiology Clinic at  [facility] on 10/29/24 to discuss concerns with hearing and functional communication difficulties.  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  Floyd notes difficulty with communication in a variety of listening situations.    OBJECTIVE:    Patient is a hearing aid candidate. Patient would like to move forward with a hearing aid evaluation today. Therefore, the patient was presented with different options for amplification to help aid in communication. Discussed styles, levels of technology and monaural vs. binaural fitting.     The hearing aid(s) mutually chosen were:  Binaural: Minh Enedina 1200 [facility]  COLOR: Beige  BATTERY SIZE: rechargeable  EARMOLD/TIPS: canal lock acrylic earmolds  CANAL/ LENGTH: 3P     Otoscopy revealed ears are clear of cerumen bilaterally. Bilateral earmolds were taken without incident.    ASSESSMENT:     Reviewed purchase information and warranty information with patient. The 45 day trial period was explained to patient. The patient was given a copy of the [place] [employer] consumer brochure on purchasing hearing instruments. Patient risk factors have been provided to the patient in writing prior to the sale of the hearing aid per FDA regulation. The risk factors are also available in the User Instructional Booklet to be presented on the day of the hearing aid fitting. Hearing aids and earmolds ordered. Hearing aid evaluation completed.    PLAN: Floyd is scheduled to return on 11/21/2024 for a hearing aid fitting and programming. Purchase agreement will be completed on that date. Please contact this clinic with any questions or concerns.    Juan Kimball, CCC-A  [place] Licensed Audiologist #[id number]

## 2024-11-21 ENCOUNTER — OFFICE VISIT (OUTPATIENT)
Dept: AUDIOLOGY | Facility: CLINIC | Age: 89
End: 2024-11-21
Payer: COMMERCIAL

## 2024-11-21 DIAGNOSIS — H90.3 SENSORINEURAL HEARING LOSS (SNHL) OF BOTH EARS: Primary | ICD-10-CM

## 2024-11-21 NOTE — PROGRESS NOTES
AUDIOLOGY REPORT - HEARING AID FITTING    SUBJECTIVE: Floyd Fong, a 89 year old male, was seen in the Audiology Clinic at [facility] today for a Binaural hearing aid fitting. 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 He has been wearing Phonak 312 MARIANELA hearing aids with power domes.     OBJECTIVE:  Prior to fitting, a hearing aid check was performed to ensure device functionality. The hearing aid conformity evaluation was completed.The hearing aids were placed and they provided a good fit. Real-ear-probe-microphone measurements were completed on the [employer] system and were a good match to NAL-NL2 target with soft sounds audible, moderate sounds comfortable, and loud sounds below discomfort. UCLs are verified through maximum power output measures and demonstrate appropriate limiting of loud inputs. Mr. Fong was oriented to proper hearing aid use, care, cleaning (no water, dry brush), batteries (size rechargeable, insertion/removal, toxicity, low-battery signal), aid insertion/removal, user booklet, warranty information, storage cases, and other hearing aid details. The patient confirmed understanding of hearing aid use and care, and showed proper insertion of hearing aid and batteries while in the office today. Mr. Fong reported good volume and sound quality today.    He is set at adaptation level 2 per his request. He has a volume control as well. He struggled a bit to insert hearing aids and does not like sound quality of his voice. I made changes for occlusion today.  Practiced multiple times with insertion and he did very well at the end of the appointment.  Will teach him how to change wax traps at next visit.     EAR(S) FIT: Binaural  HEARING AID MAKE: Right: Minh; Left: Minh    HEARING AID MODEL #: Right: Enedina AI 12 MRIC R beige; Left: Enedina AI 12 MRIC R  beige  HEARING AID  STYLE: Right: MARIANELA; Left: MARIANELA   LENGTH: Right:  Minh SnapFit 2.0 P3R; Left:  Minh SnapFit 2.0  P3L  EARMOLDS: Right: MARIANELA Earmold with canal lock, string handle, clear, digital hard EM, 60 gain rcvr, SN: [id number]; Left:  MARIANELA Earmold with canal lock, string handle, clear, digital hard EM, 60 gain rcvr, SN: [id number]  SERIAL NUMBERS: Right: [id number]; Left: [id number]  WARRANTY END DATE: Right: 2/5/2028; Left:: 2/5/2028       ASSESSMENT: Binaural fitting of Minh Enedina AI 12 [facility] hearing aids with acrylic canal lock earmolds was completed today. Verification measures were performed. The 45 day trial period was explained to patient, and they expressed understanding. Mr. Fong signed the Hearing Aid Purchase Agreement and was given a copy, as well as details on his hearing aids. Patient was counseled that exact out of pocket amounts cannot be determined for hearing aid claims being sent to insurance. Any insurance coverage information presented to the patient is an estimate only, and is not a guarantee of payment. Patient has been advised to check with their own insurance.    PLAN: Mr. Fong will return for follow-up in 2-3 weeks for a hearing aid review appointment. Please call this clinic with questions regarding today s appointment.    Juan Kimball, CCC-A  [place] Licensed Audiologist #[id number]

## 2024-11-21 NOTE — PATIENT INSTRUCTIONS

## 2024-11-26 DIAGNOSIS — K58.9 IRRITABLE BOWEL SYNDROME, UNSPECIFIED TYPE: ICD-10-CM

## 2024-11-26 RX ORDER — DICYCLOMINE HYDROCHLORIDE 10 MG/1
10 CAPSULE ORAL 4 TIMES DAILY PRN
Qty: 60 CAPSULE | Refills: 1 | Status: SHIPPED | OUTPATIENT
Start: 2024-11-26

## 2024-11-26 NOTE — TELEPHONE ENCOUNTER
Medication Request  Medication name: dicyclomine (BENTYL) 10 MG capsule   Requested Pharmacy: [facility]  When was patient last seen for this?:  08/19/2024  Patient offered appointment:  N/A  Okay to leave a detailed message: no

## 2024-12-10 ENCOUNTER — OFFICE VISIT (OUTPATIENT)
Dept: AUDIOLOGY | Facility: CLINIC | Age: 89
End: 2024-12-10
Payer: COMMERCIAL

## 2024-12-10 DIAGNOSIS — H90.3 SENSORINEURAL HEARING LOSS (SNHL) OF BOTH EARS: Primary | ICD-10-CM

## 2024-12-10 PROCEDURE — V5010 ASSESSMENT FOR HEARING AID: HCPCS | Performed by: AUDIOLOGIST

## 2024-12-10 NOTE — PROGRESS NOTES
AUDIOLOGY REPORT    SUBJECTIVE:Floyd Fong is a 89 year old male who was seen in the Audiology Clinic at the [facility] on 12/10/2024  for a follow-up check regarding the fitting of new hearing aids. 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 He has been wearing Phonak 312 MARIANELA hearing aids with power domes.  The patient has been seen previously in this clinic and was fit with binaural Minh Enedina AI 12 [facility] hearing aids with custom canal lock earmolds on 11/21/2024.      OBJECTIVE:     Based on patient report, the following changes were made:    Floyd reports he is hearing better all around than with his previous devices.  He states the left hearing aid was giving him an alert about not working, but then it would work again.  He will keep an eye on this.  He is having some trouble with his earmolds.  He does state he does not want to wear them much because of the issues with insertion and fit of the earmolds.  I will see if Minh can make embedded molds for him instead. He would like to try this.     Reviewed 45 day trial period, care, cleaning (no water, dry brush), batteries (size rechargeable) insertion/removal, toxicity, low-battery signal), aid insertion/removal, volume adjustment (if applicable), user booklet, warranty information, storage cases, and other hearing aid details.     No charge visit today (in warranty hearing aid check).     ASSESSMENT: A follow-up appointment for hearing aid fitting was completed today.  Changes to hearing aid was completed as outlined above.     PLAN: Floyd will return for follow-up when I get his new embedded molds to see if they provide a better fit. I will get him on my schedule as soon as the molds are received.  Please call this clinic with any questions regarding today s appointment.    Elroy Kimball., CCC-A  [place] Licensed Audiologist  #[id number]

## 2024-12-16 ENCOUNTER — OFFICE VISIT (OUTPATIENT)
Dept: FAMILY MEDICINE | Facility: CLINIC | Age: 89
End: 2024-12-16
Payer: COMMERCIAL

## 2024-12-16 VITALS
WEIGHT: 166.7 LBS | HEIGHT: 68 IN | HEART RATE: 52 BPM | SYSTOLIC BLOOD PRESSURE: 134 MMHG | TEMPERATURE: 98.3 F | DIASTOLIC BLOOD PRESSURE: 84 MMHG | BODY MASS INDEX: 25.26 KG/M2 | OXYGEN SATURATION: 97 % | RESPIRATION RATE: 14 BRPM

## 2024-12-16 DIAGNOSIS — I25.10 CORONARY ARTERY DISEASE INVOLVING NATIVE HEART WITHOUT ANGINA PECTORIS, UNSPECIFIED VESSEL OR LESION TYPE: ICD-10-CM

## 2024-12-16 DIAGNOSIS — E78.2 HYPERLIPEMIA, MIXED: ICD-10-CM

## 2024-12-16 DIAGNOSIS — K21.9 GASTROESOPHAGEAL REFLUX DISEASE WITHOUT ESOPHAGITIS: ICD-10-CM

## 2024-12-16 DIAGNOSIS — F32.2 CURRENT SEVERE EPISODE OF MAJOR DEPRESSIVE DISORDER WITHOUT PSYCHOTIC FEATURES WITHOUT PRIOR EPISODE (H): ICD-10-CM

## 2024-12-16 DIAGNOSIS — Z00.00 ENCOUNTER FOR MEDICARE ANNUAL WELLNESS EXAM: Primary | ICD-10-CM

## 2024-12-16 DIAGNOSIS — I10 BENIGN ESSENTIAL HYPERTENSION: ICD-10-CM

## 2024-12-16 DIAGNOSIS — H91.93 DECREASED HEARING OF BOTH EARS: ICD-10-CM

## 2024-12-16 DIAGNOSIS — K58.9 IRRITABLE BOWEL SYNDROME, UNSPECIFIED TYPE: ICD-10-CM

## 2024-12-16 DIAGNOSIS — L29.9 ITCHY SCALP: ICD-10-CM

## 2024-12-16 DIAGNOSIS — F43.21 GRIEF: ICD-10-CM

## 2024-12-16 DIAGNOSIS — I48.92 ATRIAL FLUTTER, UNSPECIFIED TYPE (H): ICD-10-CM

## 2024-12-16 PROCEDURE — 36415 COLL VENOUS BLD VENIPUNCTURE: CPT

## 2024-12-16 PROCEDURE — 99214 OFFICE O/P EST MOD 30 MIN: CPT | Mod: 25

## 2024-12-16 PROCEDURE — G0439 PPPS, SUBSEQ VISIT: HCPCS

## 2024-12-16 PROCEDURE — 80061 LIPID PANEL: CPT

## 2024-12-16 RX ORDER — FAMOTIDINE 20 MG/1
20 TABLET, FILM COATED ORAL
Qty: 90 TABLET | Refills: 3 | Status: SHIPPED | OUTPATIENT
Start: 2024-12-16

## 2024-12-16 RX ORDER — DICYCLOMINE HYDROCHLORIDE 10 MG/1
10 CAPSULE ORAL 4 TIMES DAILY PRN
Qty: 60 CAPSULE | Refills: 1 | Status: SHIPPED | OUTPATIENT
Start: 2024-12-16

## 2024-12-16 SDOH — HEALTH STABILITY: PHYSICAL HEALTH: ON AVERAGE, HOW MANY DAYS PER WEEK DO YOU ENGAGE IN MODERATE TO STRENUOUS EXERCISE (LIKE A BRISK WALK)?: 4 DAYS

## 2024-12-16 ASSESSMENT — SOCIAL DETERMINANTS OF HEALTH (SDOH): HOW OFTEN DO YOU GET TOGETHER WITH FRIENDS OR RELATIVES?: TWICE A WEEK

## 2024-12-16 ASSESSMENT — COLUMBIA-SUICIDE SEVERITY RATING SCALE - C-SSRS
5. IN THE PAST MONTH, HAVE YOU STARTED TO WORK OUT OR WORKED OUT THE DETAILS OF HOW TO KILL YOURSELF? DO YOU INTEND TO CARRY OUT THIS PLAN?: NO
4. IN THE PAST MONTH, HAVE YOU HAD THESE THOUGHTS AND HAD SOME INTENTION OF ACTING ON THEM?: NO
6. HAVE YOU EVER DONE ANYTHING, STARTED TO DO ANYTHING, OR PREPARED TO DO ANYTHING TO END YOUR LIFE?: NO
1. WITHIN THE PAST MONTH, HAVE YOU WISHED YOU WERE DEAD OR WISHED YOU COULD GO TO SLEEP AND NOT WAKE UP?: YES
3. IN THE PAST MONTH, HAVE YOU BEEN THINKING ABOUT HOW YOU MIGHT KILL YOURSELF?: YES
2. IN THE PAST MONTH, HAVE YOU ACTUALLY HAD ANY THOUGHTS OF KILLING YOURSELF?: YES

## 2024-12-16 ASSESSMENT — PATIENT HEALTH QUESTIONNAIRE - PHQ9
10. IF YOU CHECKED OFF ANY PROBLEMS, HOW DIFFICULT HAVE THESE PROBLEMS MADE IT FOR YOU TO DO YOUR WORK, TAKE CARE OF THINGS AT HOME, OR GET ALONG WITH OTHER PEOPLE: VERY DIFFICULT
SUM OF ALL RESPONSES TO PHQ QUESTIONS 1-9: 18
SUM OF ALL RESPONSES TO PHQ QUESTIONS 1-9: 18

## 2024-12-16 NOTE — ASSESSMENT & PLAN NOTE
In the interim has been seen by audiology and fitted with new hearing aids. He has noticed significant improvement and although he was warned of this, is disappointed.

## 2024-12-16 NOTE — ASSESSMENT & PLAN NOTE
Blood pressure today is 134/84 indicating adequate control on current regimen of ramipril. No concerns for side effects or issues with adherence. UTD on monitoring labs (done 8/2024).    Last Comprehensive Metabolic Panel:  Sodium   Date Value Ref Range Status   08/01/2024 139 135 - 145 mmol/L Final     Potassium   Date Value Ref Range Status   08/01/2024 4.1 3.4 - 5.3 mmol/L Final     Chloride   Date Value Ref Range Status   08/01/2024 104 98 - 107 mmol/L Final     Carbon Dioxide (CO2)   Date Value Ref Range Status   08/01/2024 25 22 - 29 mmol/L Final     Anion Gap   Date Value Ref Range Status   08/01/2024 10 7 - 15 mmol/L Final     Glucose   Date Value Ref Range Status   08/01/2024 111 (H) 70 - 99 mg/dL Final     Urea Nitrogen   Date Value Ref Range Status   08/01/2024 16.3 8.0 - 23.0 mg/dL Final     Creatinine   Date Value Ref Range Status   08/01/2024 0.90 0.67 - 1.17 mg/dL Final     GFR Estimate   Date Value Ref Range Status   08/01/2024 83 >60 mL/min/1.73m2 Final     Comment:     eGFR calculated using 2021 CKD-EPI equation.     Calcium   Date Value Ref Range Status   08/01/2024 9.1 8.8 - 10.4 mg/dL Final     Comment:     Reference intervals for this test were updated on 7/16/2024 to reflect our healthy population more accurately. There may be differences in the flagging of prior results with similar values performed with this method. Those prior results can be interpreted in the context of the updated reference intervals.     Bilirubin Total   Date Value Ref Range Status   08/01/2024 0.7 <=1.2 mg/dL Final     Alkaline Phosphatase   Date Value Ref Range Status   08/01/2024 53 40 - 150 U/L Final     ALT   Date Value Ref Range Status   08/01/2024 17 0 - 70 U/L Final     AST   Date Value Ref Range Status   08/01/2024 21 0 - 45 U/L Final

## 2024-12-16 NOTE — ASSESSMENT & PLAN NOTE
Adequately controlled on PRN Bentyl. Refilled today.    Orders:    dicyclomine (BENTYL) 10 MG capsule; Take 1 capsule (10 mg) by mouth 4 times daily as needed (diarrhea).

## 2024-12-16 NOTE — ASSESSMENT & PLAN NOTE
Some intermittent exacerbations. Will add on nightly famotidine PRN. No red flag symptoms.    Orders:    famotidine (PEPCID) 20 MG tablet; Take 1 tablet (20 mg) by mouth nightly as needed (reflux).

## 2024-12-16 NOTE — ASSESSMENT & PLAN NOTE
Continues with anti-coagulation and follows with cardiology. No complaints of symptoms today and no recent falls or signs of bleeding/excessive bruising.

## 2024-12-16 NOTE — ASSESSMENT & PLAN NOTE
Annual exam.  PSA: Not indicated  Colonoscopy: Not indicated  Immunizations: Declines all today. Discussed/encouraged.  Labs: Discussed and offered  Mood: As documented.  BMI: 25.35.  Reviewed cognitive concerns/normal changes with aging. Passed mini-cog. Reviewed strategies to maintain brain health.  No falls. Has declined further PT visits.  Recommend follow up in one year for annual exam or sooner if needed/indicated elsewhere.

## 2024-12-16 NOTE — ASSESSMENT & PLAN NOTE
Patient self discontinued his atorvastatin at our last visit. He is aware of the risks behind this in the form of cardiovascular events and he is comfortable with this risk. Offered lipid panel today which he is amenable to.

## 2024-12-16 NOTE — ASSESSMENT & PLAN NOTE
Orders:    Lipid panel reflex to direct LDL Non-fasting; Future    Lipid panel reflex to direct LDL Non-fasting

## 2024-12-16 NOTE — PATIENT INSTRUCTIONS
For mental health: I placed an urgent referral to psychiatry. Please schedule after the first of the year and check with insurance. They can also help with counseling.    For itchy scalp: Please  an emollient such as Aquaphor or CeraVe (they come in tubs, not bottles). Try applying this once or twice daily.     Will continue all medications as previously prescribed.    Visit with me in the coming months for focus on mental health.     TDAP at the pharmacy. Pneumonia vaccine is also recommended.    Patient Education   Preventive Care Advice   This is general advice given by our system to help you stay healthy. However, your care team may have specific advice just for you. Please talk to your care team about your preventive care needs.  Nutrition  Eat 5 or more servings of fruits and vegetables each day.  Try wheat bread, brown rice and whole grain pasta (instead of white bread, rice, and pasta).  Get enough calcium and vitamin D. Check the label on foods and aim for 100% of the RDA (recommended daily allowance).  Lifestyle  Exercise at least 150 minutes each week  (30 minutes a day, 5 days a week).  Do muscle strengthening activities 2 days a week. These help control your weight and prevent disease.  No smoking.  Wear sunscreen to prevent skin cancer.  Have a dental exam and cleaning every 6 months.  Yearly exams  See your health care team every year to talk about:  Any changes in your health.  Any medicines your care team has prescribed.  Preventive care, family planning, and ways to prevent chronic diseases.  Shots (vaccines)   HPV shots (up to age 26), if you've never had them before.  Hepatitis B shots (up to age 59), if you've never had them before.  COVID-19 shot: Get this shot when it's due.  Flu shot: Get a flu shot every year.  Tetanus shot: Get a tetanus shot every 10 years.  Pneumococcal, hepatitis A, and RSV shots: Ask your care team if you need these based on your risk.  Shingles shot (for age 50  and up)  General health tests  Diabetes screening:  Starting at age 35, Get screened for diabetes at least every 3 years.  If you are younger than age 35, ask your care team if you should be screened for diabetes.  Cholesterol test: At age 39, start having a cholesterol test every 5 years, or more often if advised.  Bone density scan (DEXA): At age 50, ask your care team if you should have this scan for osteoporosis (brittle bones).  Hepatitis C: Get tested at least once in your life.  STIs (sexually transmitted infections)  Before age 24: Ask your care team if you should be screened for STIs.  After age 24: Get screened for STIs if you're at risk. You are at risk for STIs (including HIV) if:  You are sexually active with more than one person.  You don't use condoms every time.  You or a partner was diagnosed with a sexually transmitted infection.  If you are at risk for HIV, ask about PrEP medicine to prevent HIV.  Get tested for HIV at least once in your life, whether you are at risk for HIV or not.  Cancer screening tests  Cervical cancer screening: If you have a cervix, begin getting regular cervical cancer screening tests starting at age 21.  Breast cancer scan (mammogram): If you've ever had breasts, begin having regular mammograms starting at age 40. This is a scan to check for breast cancer.  Colon cancer screening: It is important to start screening for colon cancer at age 45.  Have a colonoscopy test every 10 years (or more often if you're at risk) Or, ask your provider about stool tests like a FIT test every year or Cologuard test every 3 years.  To learn more about your testing options, visit:   .  For help making a decision, visit:   https://bit.ly/ag76375.  Prostate cancer screening test: If you have a prostate, ask your care team if a prostate cancer screening test (PSA) at age 55 is right for you.  Lung cancer screening: If you are a current or former smoker ages 50 to 80, ask your care team if  ongoing lung cancer screenings are right for you.  For informational purposes only. Not to replace the advice of your health care provider. Copyright   2023 [facility]. All rights reserved. Clinically reviewed by the [facility] Transitions [facility]. [employer] [id number] - REV 01/24.  Preventing Falls: Care Instructions  Injuries and health problems such as trouble walking or poor eyesight can increase your risk of falling. So can some medicines. But there are things you can do to help prevent falls. You can exercise to get stronger. You can also arrange your home to make it safer.    Talk to your doctor about the medicines you take. Ask if any of them increase the risk of falls and whether they can be changed or stopped.   Try to exercise regularly. It can help improve your strength and balance. This can help lower your risk of falling.         Practice fall safety and prevention.   Wear low-heeled shoes that fit well and give your feet good support. Talk to your doctor if you have foot problems that make this hard.  Carry a cellphone or wear a medical alert device that you can use to call for help.  Use stepladders instead of chairs to reach high objects. Don't climb if you're at risk for falls. Ask for help, if needed.  Wear the correct eyeglasses, if you need them.        Make your home safer.   Remove rugs, cords, clutter, and furniture from walkways.  Keep your house well lit. Use night-lights in hallways and bathrooms.  Install and use sturdy handrails on stairways.  Wear nonskid footwear, even inside. Don't walk barefoot or in socks without shoes.        Be safe outside.   Use handrails, curb cuts, and ramps whenever possible.  Keep your hands free by using a shoulder bag or backpack.  Try to walk in well-lit areas. Watch out for uneven ground, changes in pavement, and debris.  Be careful in the winter. Walk on the grass or gravel when sidewalks are slippery. Use de-icer on steps and  "walkways. Add non-slip devices to shoes.    Put grab bars and nonskid mats in your shower or tub and near the toilet. Try to use a shower chair or bath bench when bathing.   Get into a tub or shower by putting in your weaker leg first. Get out with your strong side first. Have a phone or medical alert device in the bathroom with you.   Where can you learn more?  Go to https://www.HEALBE.net/patiented  Enter G117 in the search box to learn more about \"Preventing Falls: Care Instructions.\"  Current as of: July 17, 2023  Content Version: 14.2 2024 [employer].   Care instructions adapted under license by your healthcare professional. If you have questions about a medical condition or this instruction, always ask your healthcare professional. [employer] disclaims any warranty or liability for your use of this information.    Hearing Loss: Care Instructions  Overview     Hearing loss is a sudden or slow decrease in how well you hear. It can range from slight to profound. Permanent hearing loss can occur with aging. It also can happen when you are exposed long-term to loud noise. Examples include listening to loud music, riding motorcycles, or being around other loud machines.  Hearing loss can affect your work and home life. It can make you feel lonely or depressed. You may feel that you have lost your independence. But hearing aids and other devices can help you hear better and feel connected to others.  Follow-up care is a key part of your treatment and safety. Be sure to make and go to all appointments, and call your doctor if you are having problems. It's also a good idea to know your test results and keep a list of the medicines you take.  How can you care for yourself at home?  Avoid loud noises whenever possible. This helps keep your hearing from getting worse.  Always wear hearing protection around loud noises.  Wear a hearing aid as directed.  A professional can help you pick a " "hearing aid that will work best for you.  You can also get hearing aids over the counter for mild to moderate hearing loss.  Have hearing tests as your doctor suggests. They can show whether your hearing has changed. Your hearing aid may need to be adjusted.  Use other devices as needed. These may include:  Telephone amplifiers and hearing aids that can connect to a television, stereo, radio, or microphone.  Devices that use lights or vibrations. These alert you to the doorbell, a ringing telephone, or a baby monitor.  Television closed-captioning. This shows the words at the bottom of the screen. Most new TVs can do this.  TTY (text telephone). This lets you type messages back and forth on the telephone instead of talking or listening. These devices are also called TDD. When messages are typed on the keyboard, they are sent over the phone line to a receiving TTY. The message is shown on a monitor.  Use text messaging, social media, and email if it is hard for you to communicate by telephone.  Try to learn a listening technique called speechreading. It is not lipreading. You pay attention to people's gestures, expressions, posture, and tone of voice. These clues can help you understand what a person is saying. Face the person you are talking to, and have them face you. Make sure the lighting is good. You need to see the other person's face clearly.  Think about counseling if you need help to adjust to your hearing loss.  When should you call for help?  Watch closely for changes in your health, and be sure to contact your doctor if:    You think your hearing is getting worse.     You have new symptoms, such as dizziness or nausea.   Where can you learn more?  Go to https://www.Fluential.net/patiented  Enter R798 in the search box to learn more about \"Hearing Loss: Care Instructions.\"  Current as of: September 27, 2023  Content Version: 14.2 2024 [employer].   Care instructions adapted under license by " your healthcare professional. If you have questions about a medical condition or this instruction, always ask your healthcare professional. Healthwise, [facility] disclaims any warranty or liability for your use of this information.    Learning About Stress  What is stress?     Stress is your body's response to a hard situation. Your body can have a physical, emotional, or mental response. Stress is a fact of life for most people, and it affects everyone differently. What causes stress for you may not be stressful for someone else.  A lot of things can cause stress. You may feel stress when you go on a job interview, take a test, or run a race. This kind of short-term stress is normal and even useful. It can help you if you need to work hard or react quickly. For example, stress can help you finish an important job on time.  Long-term stress is caused by ongoing stressful situations or events. Examples of long-term stress include long-term health problems, ongoing problems at work, or conflicts in your family. Long-term stress can harm your health.  How does stress affect your health?  When you are stressed, your body responds as though you are in danger. It makes hormones that speed up your heart, make you breathe faster, and give you a burst of energy. This is called the fight-or-flight stress response. If the stress is over quickly, your body goes back to normal and no harm is done.  But if stress happens too often or lasts too long, it can have bad effects. Long-term stress can make you more likely to get sick, and it can make symptoms of some diseases worse. If you tense up when you are stressed, you may develop neck, shoulder, or low back pain. Stress is linked to high blood pressure and heart disease.  Stress also harms your emotional health. It can make you jarquin, tense, or depressed. Your relationships may suffer, and you may not do well at work or school.  What can you do to manage stress?  You can try  these things to help manage stress:   Do something active. Exercise or activity can help reduce stress. Walking is a great way to get started. Even everyday activities such as housecleaning or yard work can help.  Try yoga or zayda chi. These techniques combine exercise and meditation. You may need some training at first to learn them.  Do something you enjoy. For example, listen to music or go to a movie. Practice your hobby or do volunteer work.  Meditate. This can help you relax, because you are not worrying about what happened before or what may happen in the future.  Do guided imagery. Imagine yourself in any setting that helps you feel calm. You can use online videos, books, or a teacher to guide you.  Do breathing exercises. For example:  From a standing position, bend forward from the waist with your knees slightly bent. Let your arms dangle close to the floor.  Breathe in slowly and deeply as you return to a standing position. Roll up slowly and lift your head last.  Hold your breath for just a few seconds in the standing position.  Breathe out slowly and bend forward from the waist.  Let your feelings out. Talk, laugh, cry, and express anger when you need to. Talking with supportive friends or family, a counselor, or a luciana leader about your feelings is a healthy way to relieve stress. Avoid discussing your feelings with people who make you feel worse.  Write. It may help to write about things that are bothering you. This helps you find out how much stress you feel and what is causing it. When you know this, you can find better ways to cope.  What can you do to prevent stress?  You might try some of these things to help prevent stress:  Manage your time. This helps you find time to do the things you want and need to do.  Get enough sleep. Your body recovers from the stresses of the day while you are sleeping.  Get support. Your family, friends, and community can make a difference in how you experience  "stress.  Limit your news feed. Avoid or limit time on social media or news that may make you feel stressed.  Do something active. Exercise or activity can help reduce stress. Walking is a great way to get started.  Where can you learn more?  Go to https://www.[employer].net/patiented  Enter N032 in the search box to learn more about \"Learning About Stress.\"  Current as of: October 24, 2023  Content Version: 14.2 2024 [employer].   Care instructions adapted under license by your healthcare professional. If you have questions about a medical condition or this instruction, always ask your healthcare professional. [employer] disclaims any warranty or liability for your use of this information.    Learning About Sleeping Well  What does sleeping well mean?     Sleeping well means getting enough sleep to feel good and stay healthy. How much sleep is enough varies among people.  The number of hours you sleep and how you feel when you wake up are both important. If you do not feel refreshed, you probably need more sleep. Another sign of not getting enough sleep is feeling tired during the day.  Experts recommend that adults get at least 7 or more hours of sleep per day. Children and older adults need more sleep.  Why is getting enough sleep important?  Getting enough quality sleep is a basic part of good health. When your sleep suffers, your physical health, mood, and your thoughts can suffer too. You may find yourself feeling more grumpy or stressed. Not getting enough sleep also can lead to serious problems, including injury, accidents, anxiety, and depression.  What might cause poor sleeping?  Many things can cause sleep problems, including:  Changes to your sleep schedule.  Stress. Stress can be caused by fear about a single event, such as giving a speech. Or you may have ongoing stress, such as worry about work or school.  Depression, anxiety, and other mental or emotional " "conditions.  Changes in your sleep habits or surroundings. This includes changes that happen where you sleep, such as noise, light, or sleeping in a different bed. It also includes changes in your sleep pattern, such as having jet lag or working a late shift.  Health problems, such as pain, breathing problems, and restless legs syndrome.  Lack of regular exercise.  Using alcohol, nicotine, or caffeine before bed.  How can you help yourself?  Here are some tips that may help you sleep more soundly and wake up feeling more refreshed.  Your sleeping area   Use your bedroom only for sleeping and sex. A bit of light reading may help you fall asleep. But if it doesn't, do your reading elsewhere in the house. Try not to use your TV, computer, smartphone, or tablet while you are in bed.  Be sure your bed is big enough to stretch out comfortably, especially if you have a sleep partner.  Keep your bedroom quiet, dark, and cool. Use curtains, blinds, or a sleep mask to block out light. To block out noise, use earplugs, soothing music, or a \"white noise\" machine.  Your evening and bedtime routine   Create a relaxing bedtime routine. You might want to take a warm shower or bath, or listen to soothing music.  Go to bed at the same time every night. And get up at the same time every morning, even if you feel tired.  What to avoid   Limit caffeine (coffee, tea, caffeinated sodas) during the day, and don't have any for at least 6 hours before bedtime.  Avoid drinking alcohol before bedtime. Alcohol can cause you to wake up more often during the night.  Try not to smoke or use tobacco, especially in the evening. Nicotine can keep you awake.  Limit naps during the day, especially close to bedtime.  Avoid lying in bed awake for too long. If you can't fall asleep or if you wake up in the middle of the night and can't get back to sleep within about 20 minutes, get out of bed and go to another room until you feel sleepy.  Avoid taking " "medicine right before bed that may keep you awake or make you feel hyper or energized. Your doctor can tell you if your medicine may do this and if you can take it earlier in the day.  If you can't sleep   Imagine yourself in a peaceful, pleasant scene. Focus on the details and feelings of being in a place that is relaxing.  Get up and do a quiet or boring activity until you feel sleepy.  Avoid drinking any liquids before going to bed to help prevent waking up often to use the bathroom.  Where can you learn more?  Go to https://www.[employer].net/patiented  Enter J942 in the search box to learn more about \"Learning About Sleeping Well.\"  Current as of: July 10, 2023  Content Version: 14.2 2024 [employer].   Care instructions adapted under license by your healthcare professional. If you have questions about a medical condition or this instruction, always ask your healthcare professional. Healthwise, Incorporated disclaims any warranty or liability for your use of this information.    Learning About Depression Screening  What is depression screening?  Depression screening is a way to see if you have depression symptoms. It may be done by a doctor or counselor. It's often part of a routine checkup. That's because your mental health is just as important as your physical health.  Depression is a mental health condition that affects how you feel, think, and act. You may:  Have less energy.  Lose interest in your daily activities.  Feel sad and grouchy for a long time.  Depression is very common. It affects people of all ages.  Many things can lead to depression. Some people become depressed after they have a stroke or find out they have a major illness like cancer or heart disease. The death of a loved one or a breakup may lead to depression. It can run in families. Most experts believe that a combination of inherited genes and stressful life events can cause it.  What happens during screening?  You may be " "asked to fill out a form about your depression symptoms. You and the doctor will discuss your answers. The doctor may ask you more questions to learn more about how you think, act, and feel.  What happens after screening?  If you have symptoms of depression, your doctor will talk to you about your options.  Doctors usually treat depression with medicines or counseling. Often, combining the two works best. Many people don't get help because they think that they'll get over the depression on their own. But people with depression may not get better unless they get treatment.  The cause of depression is not well understood. There may be many factors involved. But if you have depression, it's not your fault.  A serious symptom of depression is thinking about death or suicide. If you or someone you care about talks about this or about feeling hopeless, get help right away.  It's important to know that depression can be treated. Medicine, counseling, and self-care may help.  Where can you learn more?  Go to https://www.[employer].net/patiented  Enter T185 in the search box to learn more about \"Learning About Depression Screening.\"  Current as of: June 24, 2023  Content Version: 14.2 2024 [facility] [employer].   Care instructions adapted under license by your healthcare professional. If you have questions about a medical condition or this instruction, always ask your healthcare professional. Healthwise, Incorporated disclaims any warranty or liability for your use of this information.       "

## 2024-12-16 NOTE — PROGRESS NOTES
Preventive Care Visit  [facility]  LEELA Melendez CNP, Family Medicine  Dec 16, 2024      Assessment & Plan   Assessment & Plan  Encounter for Medicare annual wellness exam  Annual exam.  PSA: Not indicated  Colonoscopy: Not indicated  Immunizations: Declines all today. Discussed/encouraged.  Labs: Discussed and offered  Mood: As documented.  BMI: 25.35.  Reviewed cognitive concerns/normal changes with aging. Passed mini-cog. Reviewed strategies to maintain brain health.  No falls. Has declined further PT visits.  Recommend follow up in one year for annual exam or sooner if needed/indicated elsewhere.         Coronary artery disease involving native heart without angina pectoris, unspecified vessel or lesion type    Orders:    Lipid panel reflex to direct LDL Non-fasting; Future    Lipid panel reflex to direct LDL Non-fasting    Atrial flutter, unspecified type (H)  Continues with anti-coagulation and follows with cardiology. No complaints of symptoms today and no recent falls or signs of bleeding/excessive bruising.          Benign essential hypertension  Blood pressure today is 134/84 indicating adequate control on current regimen of ramipril. No concerns for side effects or issues with adherence. UTD on monitoring labs (done 8/2024).    Last Comprehensive Metabolic Panel:  Sodium   Date Value Ref Range Status   08/01/2024 139 135 - 145 mmol/L Final     Potassium   Date Value Ref Range Status   08/01/2024 4.1 3.4 - 5.3 mmol/L Final     Chloride   Date Value Ref Range Status   08/01/2024 104 98 - 107 mmol/L Final     Carbon Dioxide (CO2)   Date Value Ref Range Status   08/01/2024 25 22 - 29 mmol/L Final     Anion Gap   Date Value Ref Range Status   08/01/2024 10 7 - 15 mmol/L Final     Glucose   Date Value Ref Range Status   08/01/2024 111 (H) 70 - 99 mg/dL Final     Urea Nitrogen   Date Value Ref Range Status   08/01/2024 16.3 8.0 - 23.0 mg/dL Final     Creatinine   Date Value Ref  Range Status   08/01/2024 0.90 0.67 - 1.17 mg/dL Final     GFR Estimate   Date Value Ref Range Status   08/01/2024 83 >60 mL/min/1.73m2 Final     Comment:     eGFR calculated using 2021 CKD-EPI equation.     Calcium   Date Value Ref Range Status   08/01/2024 9.1 8.8 - 10.4 mg/dL Final     Comment:     Reference intervals for this test were updated on 7/16/2024 to reflect our healthy population more accurately. There may be differences in the flagging of prior results with similar values performed with this method. Those prior results can be interpreted in the context of the updated reference intervals.     Bilirubin Total   Date Value Ref Range Status   08/01/2024 0.7 <=1.2 mg/dL Final     Alkaline Phosphatase   Date Value Ref Range Status   08/01/2024 53 40 - 150 U/L Final     ALT   Date Value Ref Range Status   08/01/2024 17 0 - 70 U/L Final     AST   Date Value Ref Range Status   08/01/2024 21 0 - 45 U/L Final        Decreased hearing of both ears  In the interim has been seen by audiology and fitted with new hearing aids. He has noticed significant improvement and although he was warned of this, is disappointed.          Gastroesophageal reflux disease without esophagitis  Some intermittent exacerbations. Will add on nightly famotidine PRN. No red flag symptoms.    Orders:    famotidine (PEPCID) 20 MG tablet; Take 1 tablet (20 mg) by mouth nightly as needed (reflux).    Hyperlipemia, mixed  Patient self discontinued his atorvastatin at our last visit. He is aware of the risks behind this in the form of cardiovascular events and he is comfortable with this risk. Offered lipid panel today which he is amenable to.         Irritable bowel syndrome, unspecified type  Adequately controlled on PRN Bentyl. Refilled today.    Orders:    dicyclomine (BENTYL) 10 MG capsule; Take 1 capsule (10 mg) by mouth 4 times daily as needed (diarrhea).    Itchy scalp  No rash or excoriation on exam. Would recommend starting with  "emollient therapy and escalating therapy if needed.       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 PHQ-9 is significantly elevated today as it has been with previous visits. Mental health concerns stem for the relatively recent passing of his wife. He has tried some grief groups but continues to report his significant hearing loss has negated any benefit he would get from this. Recently moved to a new living situation and this has amplified anxiety as well. His daughter Anastasiia is his primary support, whom he sees at least twice a week. Notes some recent cognitive decline is also contributing to worsening mental health. I have strongly encouraged him to look into 1:1 grief counseling. He unfortunately did not tolerate the low dose escitalopram although is unable to articulate exactly what side effects he had (only took one dose). He is not willing to try another medication today in the primary care setting but is amenable to me placing a referral for urgent psychiatry consultation. He expresses concern that he is to the point of giving up and continues to endorse thoughts of ending his life although he denies any safety concerns. His family is also aware of this as I have had a discussion with his daughter personally. He is aware of what necessitates an ER visit. I have asked for close follow up with myself and he was encouraged to schedule this at the desk prior to leaving clinic today.     Orders:    Adult Mental Health  Referral; Future    Grief    Orders:    Adult Mental Health  Referral; Future      Patient has been advised of split billing requirements and indicates understanding: Yes       BMI  Estimated body mass index is 25.35 kg/m  as calculated from the following:    Height as of this encounter: 1.727 m (5' 8\").    Weight as of this encounter: 75.6 kg (166 lb 11.2 oz).       Depression " Screening Follow Up        12/16/2024     1:55 PM   PHQ   PHQ-9 Total Score 18    Q9: Thoughts of better off dead/self-harm past 2 weeks Nearly every day    F/U: Thoughts of suicide or self-harm Yes    F/U: Self harm-plan Yes    F/U: Self-harm action No    F/U: Safety concerns No        Patient-reported         12/16/2024     1:55 PM   Last PHQ-9   1.  Little interest or pleasure in doing things 2    2.  Feeling down, depressed, or hopeless 3    3.  Trouble falling or staying asleep, or sleeping too much 2    4.  Feeling tired or having little energy 2    5.  Poor appetite or overeating 2    6.  Feeling bad about yourself 2    7.  Trouble concentrating 1    8.  Moving slowly or restless 1    Q9: Thoughts of better off dead/self-harm past 2 weeks 3    PHQ-9 Total Score 18    In the past two weeks have you had thoughts of suicide or self harm? Yes    Do you have concerns about your personal safety or the safety of others? No    In the past 2 weeks have you thought about a plan or had intention to harm yourself? Yes    In the past 2 weeks have you acted on these thoughts in any way? No        Patient-reported         12/16/2024     3:37 PM   C-SSRS (Brief [place])   Within the last month, have you wished you were dead or wished you could go to sleep and not wake up? Yes   Within the last month, have you had any actual thoughts of killing yourself? Yes   Within the last month, have you been thinking about how you might do this? Yes   Within the last month, have you had these thoughts and had some intention of acting on them? No   Within the last month, have you started to work out or worked out the details of how to kill yourself with the intent to carry out this plan? No   Within the last month, have you ever done anything, started to do anything, or prepared to do anything to end your life? No               Follow Up Actions Taken  Crisis resource information provided in the After Visit Summary  Mental Health  Referral placed    Discussed the following ways the patient can remain in a safe environment:  dispose of old medications , remove things I could use to hurt myself:  , and be around others  Counseling  Appropriate preventive services were addressed with this patient via screening, questionnaire, or discussion as appropriate for fall prevention, nutrition, physical activity, Tobacco-use cessation, social engagement, weight loss and cognition.  Checklist reviewing preventive services available has been given to the patient.  Reviewed patient's diet, addressing concerns and/or questions.   The patient was instructed to see the dentist every 6 months.   He is at risk for psychosocial distress and has been provided with information to reduce risk.   Discussed possible causes of fatigue. The patient was provided with written information regarding signs of hearing loss.   The patient's PHQ-9 score is consistent with moderate depression. He was provided with information regarding depression.     Sara Barrientos is a 89 year old, presenting for the following:  Physical        12/16/2024     2:03 PM   Additional Questions   Roomed by ac   Accompanied by self           HPI  Patient is a very pleasant 89 year old presenting today for his annual physical. He continues to struggle from a mental health perspective. He also notes that he continues to have orthopedic pain and issues with balance but no falls. He is requesting refills on his Bentyl.     Health Care Directive  Patient does not have a Health Care Directive: Discussed advance care planning with patient; information given to patient to review.      12/16/2024   General Health   How would you rate your overall physical health? (!) FAIR   Feel stress (tense, anxious, or unable to sleep) Very much      (!) STRESS CONCERN      12/16/2024   Nutrition   Diet: Regular (no restrictions)            12/16/2024   Exercise   Days per week of moderate/strenous exercise 4 days             12/16/2024   Social Factors   Frequency of gathering with friends or relatives Twice a week   Worry food won't last until get money to buy more No   Food not last or not have enough money for food? No   Do you have housing? (Housing is defined as stable permanent housing and does not include staying ouside in a car, in a tent, in an abandoned building, in an overnight shelter, or couch-surfing.) Yes   Are you worried about losing your housing? No   Lack of transportation? No   Unable to get utilities (heat,electricity)? No          12/16/2024   Fall Risk   Fallen 2 or more times in the past year? Yes    Trouble with walking or balance? Yes    Gait Speed Test (Document in seconds) 5   Gait Speed Test Interpretation Less than or equal to 5.00 seconds - PASS       Patient-reported          12/16/2024   Activities of Daily Living- Home Safety   Needs help with the following daily activites None of the above   Safety concerns in the home None of the above            12/16/2024   Dental   Dentist two times every year? (!) NO            12/16/2024   Hearing Screening   Hearing concerns? (!) I FEEL THAT PEOPLE ARE MUMBLING OR NOT SPEAKING CLEARLY.    (!) I NEED TO ASK PEOPLE TO SPEAK UP OR REPEAT THEMSELVES.    (!) IT'S HARDER TO UNDERSTAND WOMEN'S VOICES THAN MEN'S VOICES.    (!) IT'S HARD TO FOLLOW A CONVERSATION IN A NOISY RESTAURANT OR CROWDED ROOM.    (!) TROUBLE UNDESTANDING A SPEAKER IN A PUBLIC MEETING OR [facility]ICE.    (!) TROUBLE FOLLOWING DIALOGUE IN THE THEATHER.    (!) TROUBLE UNDERSTANDING SOFT OR WHISPERED SPEECH.    (!) TROUBLE UNDERSTANDING SPEECH ON THE TELEPHONE       Multiple values from one day are sorted in reverse-chronological order         12/16/2024   Driving Risk Screening   Patient/family members have concerns about driving No            12/16/2024   General Alertness/Fatigue Screening   Have you been more tired than usual lately? (!) YES            12/16/2024   Urinary  Incontinence Screening   Bothered by leaking urine in past 6 months No            12/16/2024   TB Screening   Were you born outside of the [place]? No          Today's PHQ-9 Score:       12/16/2024     1:55 PM   PHQ-9 SCORE   PHQ-9 Total Score MyChart 18 (Moderately severe depression)   PHQ-9 Total Score 18        Patient-reported         12/16/2024   Substance Use   Alcohol more than 3/day or more than 7/wk No   Do you have a current opioid prescription? No   How severe/bad is pain from 1 to 10? 8/10   Do you use any other substances recreationally? No        Social History     Tobacco Use    Smoking status: Never     Passive exposure: Never    Smokeless tobacco: Never   Vaping Use    Vaping status: Never Used   Substance Use Topics    Alcohol use: Not Currently    Drug use: Never             Reviewed and updated as needed this visit by Provider                  Current providers sharing in care for this patient include:  Patient Care Team:  Evie Payne APRN CNP as PCP - General (Family Medicine)  Evie Payne APRN CNP as Assigned PCP  Jan Zarco MD as MD (Cardiovascular Disease)  Jan Zarco MD as Assigned Heart and Vascular Provider  Annalise Lutz AuD as Audiologist (Audiology)    The following health maintenance items are reviewed in Epic and correct as of today:  Health Maintenance   Topic Date Due    DEPRESSION ACTION PLAN  Never done    Pneumococcal Vaccine: 65+ Years (1 of 2 - PCV) Never done    DTAP/TDAP/TD IMMUNIZATION (1 - Tdap) Never done    ZOSTER IMMUNIZATION (1 of 2) Never done    RSV VACCINE (1 - 1-dose 75+ series) Never done    COVID-19 Vaccine (3 - 2024-25 season) 11/27/2024    LIPID  12/05/2024    PHQ-9  06/16/2025    BMP  08/01/2025    MEDICARE ANNUAL WELLNESS VISIT  12/16/2025    ANNUAL REVIEW OF HM ORDERS  12/16/2025    FALL RISK ASSESSMENT  12/16/2025    ADVANCE CARE PLANNING  12/16/2029    INFLUENZA VACCINE  Completed    HPV IMMUNIZATION  Aged Out    MENINGITIS  "IMMUNIZATION  Aged Out    RSV MONOCLONAL ANTIBODY  Aged Out        Objective    Exam  /84 (BP Location: Left arm, Patient Position: Sitting, Cuff Size: Adult Regular)   Pulse 52   Temp 98.3  F (36.8  C) (Oral)   Resp 14   Ht 1.727 m (5' 8\")   Wt 75.6 kg (166 lb 11.2 oz)   SpO2 97%   BMI 25.35 kg/m     Estimated body mass index is 25.35 kg/m  as calculated from the following:    Height as of this encounter: 1.727 m (5' 8\").    Weight as of this encounter: 75.6 kg (166 lb 11.2 oz).    Physical Exam  Vitals and nursing note reviewed.   Constitutional:       General: He is not in acute distress.     Appearance: He is not ill-appearing.   Cardiovascular:      Rate and Rhythm: Regular rhythm. Bradycardia present.   Pulmonary:      Effort: Pulmonary effort is normal. No respiratory distress.   Neurological:      Mental Status: He is alert.   Psychiatric:         Attention and Perception: Attention normal.         Mood and Affect: Mood is depressed. Affect is tearful.         Speech: Speech normal.         Behavior: Behavior normal. Behavior is cooperative.         Thought Content: Thought content includes suicidal ideation. Thought content does not include homicidal ideation. Thought content does not include homicidal or suicidal plan.         Cognition and Memory: Cognition normal.           12/16/2024   Mini Cog   Clock Draw Score 2 Normal   3 Item Recall 3 objects recalled   Mini Cog Total Score 5                 Signed Electronically by: LEELA Melendez CNP    Answers submitted by the patient for this visit:  Patient Health Questionnaire (Submitted on 12/16/2024)  If you checked off any problems, how difficult have these problems made it for you to do your work, take care of things at home, or get along with other people?: Very difficult  PHQ9 TOTAL SCORE: 18    "

## 2024-12-16 NOTE — ASSESSMENT & PLAN NOTE
Mood remains suboptimally controlled. PHQ-9 is significantly elevated today as it has been with previous visits. Mental health concerns stem for the relatively recent passing of his wife. He has tried some grief groups but continues to report his significant hearing loss has negated any benefit he would get from this. Recently moved to a new living situation and this has amplified anxiety as well. His daughter Anastasiia is his primary support, whom he sees at least twice a week. Notes some recent cognitive decline is also contributing to worsening mental health. I have strongly encouraged him to look into 1:1 grief counseling. He unfortunately did not tolerate the low dose escitalopram although is unable to articulate exactly what side effects he had (only took one dose). He is not willing to try another medication today in the primary care setting but is amenable to me placing a referral for urgent psychiatry consultation. He expresses concern that he is to the point of giving up and continues to endorse thoughts of ending his life although he denies any safety concerns. His family is also aware of this as I have had a discussion with his daughter personally. He is aware of what necessitates an ER visit. I have asked for close follow up with myself and he was encouraged to schedule this at the desk prior to leaving clinic today.     Orders:    Adult Mental Health  Referral; Future

## 2024-12-17 LAB
CHOLEST SERPL-MCNC: 121 MG/DL
FASTING STATUS PATIENT QL REPORTED: NO
HDLC SERPL-MCNC: 46 MG/DL
LDLC SERPL CALC-MCNC: 61 MG/DL
NONHDLC SERPL-MCNC: 75 MG/DL
TRIGL SERPL-MCNC: 71 MG/DL

## 2024-12-19 ENCOUNTER — TELEPHONE (OUTPATIENT)
Dept: AUDIOLOGY | Facility: CLINIC | Age: 89
End: 2024-12-19
Payer: COMMERCIAL

## 2024-12-19 NOTE — TELEPHONE ENCOUNTER
Spoke to patient letting him know we will investigate his insurance question and get back to him. He is happy with this.     Elroy Kimball., CCC-A  [place] Licensed Audiologist #[id number]

## 2025-01-03 ENCOUNTER — OFFICE VISIT (OUTPATIENT)
Dept: AUDIOLOGY | Facility: CLINIC | Age: 89
End: 2025-01-03

## 2025-01-03 DIAGNOSIS — H90.3 SENSORINEURAL HEARING LOSS (SNHL) OF BOTH EARS: Primary | ICD-10-CM

## 2025-01-03 PROCEDURE — V5010 ASSESSMENT FOR HEARING AID: HCPCS | Performed by: AUDIOLOGIST

## 2025-01-13 NOTE — PROGRESS NOTES
Patient was in to get his ears measured for his embedded earmolds.  Measured both ears at a size 2 .  He will return soon for earmold fitting.     Elroy Kimball., CCC-A  [place] Licensed Audiologist #[id number]

## 2025-01-15 ENCOUNTER — OFFICE VISIT (OUTPATIENT)
Dept: AUDIOLOGY | Facility: CLINIC | Age: 89
End: 2025-01-15
Payer: COMMERCIAL

## 2025-01-15 DIAGNOSIS — H90.3 SENSORINEURAL HEARING LOSS (SNHL) OF BOTH EARS: Primary | ICD-10-CM

## 2025-01-15 NOTE — PROGRESS NOTES
AUDIOLOGY REPORT    EARMOLD FITTING    SUBJECTIVE:Floyd Fong is a 89 year old male who was seen in the Audiology Clinic at the [facility] on 1/15/2025  for an earmold fitting. 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  The patient has been seen previously in this clinic and was fit with binaural [employer]  [address] [facility] hearing aids with custom canal lock earmolds on 11/21/2024.  The earmolds were a poor fit, and Minh agreed to remake them into an embedded mold at no charge for patient.  He is here to be fit with those today.    OBJECTIVE:     Patient was fit with custom embedded canal lock earmolds.  Good physical fit. Patient was shown how to insert/remove earmolds and how to care for them. Patient did very well and it was a great fit.  They are much easier for him to get in his ears. Practiced changing wax traps also and he did well.    No programming was done. He is happy with the settings.     He was given his old slim tip earmolds to use as a back up.      No charge visit today (in warranty hearing aid check).    ASSESSMENT: Earmold fitting was completed today.     PLAN:Floyd will return for follow-up as needed, or at least every 6-9 months for cleaning and assessment of hearing aid.   Please call this clinic with any questions regarding today s appointment.    Elroy Kimball., CCC-A  [place] Licensed Audiologist #[id number]

## 2025-03-10 DIAGNOSIS — K58.9 IRRITABLE BOWEL SYNDROME, UNSPECIFIED TYPE: ICD-10-CM

## 2025-03-10 DIAGNOSIS — I48.91 ATRIAL FIBRILLATION, UNSPECIFIED TYPE (H): ICD-10-CM

## 2025-03-10 DIAGNOSIS — I48.92 ATRIAL FLUTTER, UNSPECIFIED TYPE (H): ICD-10-CM

## 2025-03-10 DIAGNOSIS — G47.00 INSOMNIA, UNSPECIFIED TYPE: ICD-10-CM

## 2025-03-10 RX ORDER — DICYCLOMINE HYDROCHLORIDE 10 MG/1
10 CAPSULE ORAL 4 TIMES DAILY PRN
Qty: 60 CAPSULE | Refills: 1 | Status: CANCELLED | OUTPATIENT
Start: 2025-03-10

## 2025-03-10 RX ORDER — TRAZODONE HYDROCHLORIDE 50 MG/1
50-100 TABLET ORAL
Qty: 90 TABLET | Refills: 2 | Status: CANCELLED | OUTPATIENT
Start: 2025-03-10

## 2025-03-10 RX ORDER — RAMIPRIL 5 MG/1
5 CAPSULE ORAL DAILY
Qty: 90 CAPSULE | Refills: 2 | Status: CANCELLED | OUTPATIENT
Start: 2025-03-10

## 2025-03-10 RX ORDER — TRAZODONE HYDROCHLORIDE 50 MG/1
50-100 TABLET ORAL
Qty: 90 TABLET | Refills: 0 | Status: SHIPPED | OUTPATIENT
Start: 2025-03-10

## 2025-03-10 RX ORDER — RAMIPRIL 5 MG/1
5 CAPSULE ORAL DAILY
Qty: 90 CAPSULE | Refills: 0 | Status: SHIPPED | OUTPATIENT
Start: 2025-03-10

## 2025-03-10 RX ORDER — DICYCLOMINE HYDROCHLORIDE 10 MG/1
10 CAPSULE ORAL 4 TIMES DAILY PRN
Qty: 60 CAPSULE | Refills: 0 | Status: SHIPPED | OUTPATIENT
Start: 2025-03-10

## 2025-03-10 NOTE — TELEPHONE ENCOUNTER
Medication Request  Medication name:   dicyclomine (BENTYL) 10 MG capsule   ramipril (ALTACE) 5 MG capsule   traZODone (DESYREL) 50 MG tablet   apixaban ANTICOAGULANT (ELIQUIS) 5 MG tablet   Requested Pharmacy: [facility]  When was patient last seen for this?:  12/16/24  Patient offered appointment:  No, patient is already scheduled 2:40pm  Okay to leave a detailed message: yes    Requesting refills and/or pharmacy change.

## 2025-03-18 ENCOUNTER — TELEPHONE (OUTPATIENT)
Dept: FAMILY MEDICINE | Facility: CLINIC | Age: 89
End: 2025-03-18
Payer: COMMERCIAL

## 2025-03-18 NOTE — TELEPHONE ENCOUNTER
Prior Authorization Not Needed per Insurance    Medication: APIXABAN 5 MG PO TABS  Insurance Company: [employer] - Phone [phone] Fax [phone]  Expected CoPay: $    Pharmacy Filling the Rx: [facility] - [place], [place] - [address] AT [facility]  Pharmacy Notified: y  Patient Notified: Instructed pharmacy to notify patient once order is ready.

## 2025-03-18 NOTE — TELEPHONE ENCOUNTER
Prior Authorization Request  Who s requesting:  Patient  Pharmacy Name and Location: [facility]  Medication Name: apixaban ANTICOAGULANT (ELIQUIS) 5 MG tablet   Insurance Plan: BCBS MEDICARE ADVANTAGE   Insurance Member ID Number:  [id number]   Informed patient that prior authorizations can take up to 10 business days for response:   Yes  Okay to leave a detailed message: No

## 2025-03-19 NOTE — TELEPHONE ENCOUNTER
Patient called asking to speak to GLORIA Robison, regarding need for prior authorization. Has upcoming appt and will discuss concerns with provider at this time.    Future Appointments 3/19/2025 - 9/15/2025        Date Visit Type Length Department Provider     3/21/2025  2:40 PM [facility] OFFICE VISIT  20 min [facility]/OB Evie Payne, APRN CNP    Location Instructions:     [facility] is located at [address] [facility] just north of the [place] [address] exit off of [address]. Access the parking lot by turning west from [place] [address] onto [facility].

## 2025-03-27 ENCOUNTER — TELEPHONE (OUTPATIENT)
Dept: FAMILY MEDICINE | Facility: CLINIC | Age: 89
End: 2025-03-27
Payer: COMMERCIAL

## 2025-03-27 NOTE — TELEPHONE ENCOUNTER
"03/27/25  General Call      Reason for Call:  Appeal of apixaban    What are your questions or concerns:  [employer] calling with an appeal decision for apixaban ANTICOAGULANT (ELIQUIS) 5 MG tablet.  \"This decision was found to be unfavorable.\"    They will be faxing a written detailed explanation of this decision and will include information regarding another appeal that can be made.    Appeal # [id number]    Date of last appointment with provider: 03/21/25    JERARDO Akbar [employer]: [phone]    "

## 2025-05-01 DIAGNOSIS — G47.00 INSOMNIA, UNSPECIFIED TYPE: ICD-10-CM

## 2025-05-01 RX ORDER — TRAZODONE HYDROCHLORIDE 50 MG/1
50-100 TABLET ORAL
Qty: 90 TABLET | Refills: 0 | Status: SHIPPED | OUTPATIENT
Start: 2025-05-01

## 2025-05-01 NOTE — TELEPHONE ENCOUNTER
Medication Refill  Route to [facility]  Pharmacy Name:   [facility] - [place], [place] - [address] AT [facility] (Pharmacy) [phone] (Other Fax)     Name of Medication: traZODone (DESYREL) 50 MG tablet

## 2025-06-16 DIAGNOSIS — I48.92 ATRIAL FLUTTER, UNSPECIFIED TYPE (H): ICD-10-CM

## 2025-06-16 NOTE — TELEPHONE ENCOUNTER
Medication Request  Medication name: ramipril (ALTACE) 5 MG capsule   Requested Pharmacy: [facility]  When was patient last seen for this?:  3/21/25  Patient offered appointment:  N/A pharmacy sent request  Okay to leave a detailed message: no

## 2025-06-17 RX ORDER — RAMIPRIL 5 MG/1
5 CAPSULE ORAL DAILY
Qty: 90 CAPSULE | Refills: 1 | Status: SHIPPED | OUTPATIENT
Start: 2025-06-17

## 2025-06-24 DIAGNOSIS — G47.00 INSOMNIA, UNSPECIFIED TYPE: ICD-10-CM

## 2025-06-24 RX ORDER — TRAZODONE HYDROCHLORIDE 50 MG/1
50-100 TABLET ORAL
Qty: 90 TABLET | Refills: 3 | Status: SHIPPED | OUTPATIENT
Start: 2025-06-24

## 2025-06-24 NOTE — TELEPHONE ENCOUNTER
[facility] calling stating patient is out of Trazodone and needing a refills send asap.  Last rx sent on 5/1 and filled on 5/1 was for #90 - Take 1-2 tablets ( mg) by mouth nightly as needed for sleep.     Looks like he is taking 2 tabs nightly.  Please advise on fill

## 2025-06-25 ENCOUNTER — TELEPHONE (OUTPATIENT)
Dept: FAMILY MEDICINE | Facility: CLINIC | Age: 89
End: 2025-06-25
Payer: COMMERCIAL

## 2025-06-25 NOTE — TELEPHONE ENCOUNTER
"  Forms/Letter Request    Type of form/letter: DMV/Handicap Parking      Is Release of Information needed?: No    Do we have the form/letter: Yes: placed in [employer]ie [employer]'s mailbox at     Who is the form from? Patient    Where did/will the form come from? Patient or family brought in       When is form/letter needed by: \"At Evie's convenience\" -Floyd    How would you like the form/letter returned: , patient included a stamped envelope, but would rather  form from clinic.    Patient Notified form requests are processed in 5-7 business days:Yes    Could we send this information to you in [place] or would you prefer to receive a phone call?:   Patient would prefer a phone call   Okay to leave a detailed message?: Yes at Cell number on file:    Telephone Information:   Mobile [phone]       "

## 2025-06-26 NOTE — TELEPHONE ENCOUNTER
Left detailed message and notified that form is ready and girls said he wants to . At  for .    Thank you.  Swetha GALINDO CMA

## 2025-06-26 NOTE — TELEPHONE ENCOUNTER
Form completed. Please let Floyd know and give him my best. Thanks!   Evei Payne, LEELA CNP on 6/26/2025 at 2:15 PM

## 2025-08-19 DIAGNOSIS — K58.9 IRRITABLE BOWEL SYNDROME, UNSPECIFIED TYPE: ICD-10-CM

## 2025-08-19 RX ORDER — DICYCLOMINE HYDROCHLORIDE 10 MG/1
10 CAPSULE ORAL 4 TIMES DAILY PRN
Qty: 60 CAPSULE | Refills: 0 | Status: SHIPPED | OUTPATIENT
Start: 2025-08-19